# Patient Record
Sex: FEMALE | Race: WHITE | NOT HISPANIC OR LATINO | Employment: FULL TIME | URBAN - METROPOLITAN AREA
[De-identification: names, ages, dates, MRNs, and addresses within clinical notes are randomized per-mention and may not be internally consistent; named-entity substitution may affect disease eponyms.]

---

## 2017-02-23 ENCOUNTER — OFFICE VISIT (OUTPATIENT)
Dept: URGENT CARE | Facility: URGENT CARE | Age: 27
End: 2017-02-23
Payer: COMMERCIAL

## 2017-02-23 VITALS
OXYGEN SATURATION: 98 % | HEART RATE: 93 BPM | SYSTOLIC BLOOD PRESSURE: 122 MMHG | TEMPERATURE: 98.3 F | WEIGHT: 214 LBS | DIASTOLIC BLOOD PRESSURE: 72 MMHG

## 2017-02-23 DIAGNOSIS — J06.9 VIRAL URI: Primary | ICD-10-CM

## 2017-02-23 DIAGNOSIS — J02.9 ACUTE PHARYNGITIS, UNSPECIFIED: ICD-10-CM

## 2017-02-23 LAB
DEPRECATED S PYO AG THROAT QL EIA: NORMAL
MICRO REPORT STATUS: NORMAL
SPECIMEN SOURCE: NORMAL

## 2017-02-23 PROCEDURE — 87081 CULTURE SCREEN ONLY: CPT | Performed by: PHYSICIAN ASSISTANT

## 2017-02-23 PROCEDURE — 99203 OFFICE O/P NEW LOW 30 MIN: CPT | Performed by: PHYSICIAN ASSISTANT

## 2017-02-23 PROCEDURE — 87880 STREP A ASSAY W/OPTIC: CPT | Performed by: FAMILY MEDICINE

## 2017-02-23 NOTE — PROGRESS NOTES
"SUBJECTIVE:    Arpita Marcano presents to clinic today for evaluation of sore throat, nasal congestion, clear rhinorrhea, low grade fever(possible intermittent low-grade subjective) and a mild dry, non-productive cough x 3 days duration.     No known strep or mono contacts.     Patient denies any C/N/V/D, rash, abdominal pain or any other acute illness sxs.      No past medical history on file.    No current outpatient prescriptions on file.    Allergies   Allergen Reactions     Penicillins            OBJECTIVE:  /72 (BP Location: Right arm, Patient Position: Chair, Cuff Size: Adult Regular)  Pulse 93  Temp 98.3  F (36.8  C) (Tympanic)  Wt 214 lb (97.1 kg)  LMP  (LMP Unknown)  SpO2 98%  Breastfeeding? No    General appearance: alert and no apparent distress  Skin color is pink and without rash.  HEENT:   Conjunctiva not injected.  Sclera clear.  Left TM is normal: no effusions, no erythema, and normal landmarks.  Right TM is normal: no effusions, no erythema, and normal landmarks.  Nasal mucosa is congested.  Oropharyngeal exam is positive for mild, diffuse, erythema.  No plaque, exudate, lesions, or ulcers.   Neck is supple. FROM with no adenopathy  CARDIAC:NORMAL - regular rate and rhythm without murmur.  RESP: Normal - CTA without rales, rhonchi, or wheezing.    LABS:     Rapid Strep: Negative       ASSESSMENT/PLAN:    (J06.9,  B97.89) Viral URI  (primary encounter diagnosis)  Plan: Follow-up with PCP if sxs change, worsen or fail to resolve with home comfort care measures over the next 5-7 days.  In addition to the above, viral URI \"red flag\" signs and sxs are reviewed with pt both verbally and by way of printed educational material for home review.  Pt verbalizes understanding of and agrees to the above plan.         (J02.9) Acute pharyngitis, unspecified  Plan: Strep, Rapid Screen, Beta strep group A culture  As per above       "

## 2017-02-23 NOTE — PATIENT INSTRUCTIONS

## 2017-02-23 NOTE — NURSING NOTE
Chief Complaint   Patient presents with     Urgent Care     Pharyngitis     Pt states x 4 days has had sore throat, ear ache and plugging in both ears. Pt has taken dayquil for symptoms.        Initial /72 (BP Location: Right arm, Patient Position: Chair, Cuff Size: Adult Regular)  Pulse 93  Temp 98.3  F (36.8  C) (Tympanic)  Wt 214 lb (97.1 kg)  LMP  (LMP Unknown)  SpO2 98%  Breastfeeding? No There is no height or weight on file to calculate BMI.  Medication Reconciliation: unable or not appropriate to perform   Clark Reid CMA ([facility]) 2/23/2017 9:36 AM

## 2017-02-23 NOTE — MR AVS SNAPSHOT
After Visit Summary   2/23/2017    Arpita Marcano    MRN: [id number]           Patient Information     Date Of Birth          1990        Visit Information        Provider Department      2/23/2017 9:30 AM Eron Cordova PA-C [facility]        Today's Diagnoses     Acute pharyngitis, unspecified    -  1      Care Instructions      Viral Upper Respiratory Illness (Adult)  You have a viral upper respiratory illness (URI), which is another term for the common cold. This illness is contagious during the first few days. It is spread through the air by coughing and sneezing. It may also be spread by direct contact (touching the sick person and then touching your own eyes, nose, or mouth). Frequent handwashing will decrease risk of spread. Most viral illnesses go away within 7 to 10 days with rest and simple home remedies. Sometimes the illness may last for several weeks. Antibiotics will not kill a virus, and they are generally not prescribed for this condition.    Home care    If symptoms are severe, rest at home for the first 2 to 3 days. When you resume activity, don't let yourself get too tired.    Avoid being exposed to cigarette smoke (yours or others ).    You may use acetaminophen or ibuprofen to control pain and fever, unless another medicine was prescribed. (Note: If you have chronic liver or kidney disease, have ever had a stomach ulcer or gastrointestinal bleeding, or are taking blood-thinning medicines, talk with your healthcare provider before using these medicines.) Aspirin should never be given to anyone under 18 years of age who is ill with a viral infection or fever. It may cause severe liver or brain damage.    Your appetite may be poor, so a light diet is fine. Avoid dehydration by drinking 6 to 8 glasses of fluids per day (water, soft drinks, juices, tea, or soup). Extra fluids will help loosen secretions in the nose and lungs.    Over-the-counter  cold medicines will not shorten the length of time you re sick, but they may be helpful for the following symptoms: cough, sore throat, and nasal and sinus congestion. (Note: Do not use decongestants if you have high blood pressure.)  Follow-up care  Follow up with your healthcare provider, or as advised.  When to seek medical advice  Call your healthcare provider right away if any of these occur:    Cough with lots of colored sputum (mucus)    Severe headache; face, neck, or ear pain    Difficulty swallowing due to throat pain    Fever of 100.4 F (38 C)  Call 911, or get immediate medical care  Call emergency services right away if any of these occur:    Chest pain, shortness of breath, wheezing, or difficulty breathing    Coughing up blood    Inability to swallow due to throat pain    3752-5098 [employer]. [address] [place] [address]908. All rights reserved. This information is not intended as a substitute for professional medical care. Always follow your healthcare professional's instructions.              Follow-ups after your visit        Who to contact     If you have questions or need follow up information about today's clinic visit or your schedule please contact [facility] directly at [phone].  Normal or non-critical lab and imaging results will be communicated to you by [employer], letter or phone within 4 business days after the clinic has received the results. If you do not hear from us within 7 days, please contact the clinic through [employer] or phone. If you have a critical or abnormal lab result, we will notify you by phone as soon as possible.  Submit refill requests through [employer] or call your pharmacy and they will forward the refill request to us. Please allow 3 business days for your refill to be completed.          Additional Information About Your Visit        [employer] Information     [employer] lets you send messages to your doctor, view your test  "results, renew your prescriptions, schedule appointments and more. To sign up, go to [email or web address] . Click on \"Log in\" on the left side of the screen, which will take you to the Welcome page. Then click on \"Sign up Now\" on the right side of the page.     You will be asked to enter the access code listed below, as well as some personal information. Please follow the directions to create your username and password.     Your access code is: [email or web address]  Expires: 2017 10:09 AM     Your access code will  in 90 days. If you need help or a new code, please call your [facility] clinic or [phone].        Care EveryWhere ID     This is your Care EveryWhere ID. This could be used by other organizations to access your [facility] medical records  [id number]        Your Vitals Were     Pulse Temperature Last Period Pulse Oximetry Breastfeeding?       93 98.3  F (36.8  C) (Tympanic) (LMP Unknown) 98% No        Blood Pressure from Last 3 Encounters:   17 122/72    Weight from Last 3 Encounters:   17 214 lb (97.1 kg)              We Performed the Following     Beta strep group A culture     Strep, Rapid Screen        Primary Care Provider    None Specified       No primary provider on file.        Thank you!     Thank you for choosing [facility]  for your care. Our goal is always to provide you with excellent care. Hearing back from our patients is one way we can continue to improve our services. Please take a few minutes to complete the written survey that you may receive in the mail after your visit with us. Thank you!             Your Updated Medication List - Protect others around you: Learn how to safely use, store and throw away your medicines at www.disposemymeds.org.      Notice  As of 2017 10:09 AM    You have not been prescribed any medications.      "

## 2017-02-25 LAB
BACTERIA SPEC CULT: NORMAL
MICRO REPORT STATUS: NORMAL
SPECIMEN SOURCE: NORMAL

## 2017-05-28 ENCOUNTER — APPOINTMENT (OUTPATIENT)
Dept: ULTRASOUND IMAGING | Facility: CLINIC | Age: 27
End: 2017-05-28
Attending: EMERGENCY MEDICINE
Payer: COMMERCIAL

## 2017-05-28 ENCOUNTER — HOSPITAL ENCOUNTER (EMERGENCY)
Facility: CLINIC | Age: 27
Discharge: HOME | End: 2017-05-28
Attending: EMERGENCY MEDICINE | Admitting: EMERGENCY MEDICINE
Payer: COMMERCIAL

## 2017-05-28 VITALS
OXYGEN SATURATION: 99 % | RESPIRATION RATE: 18 BRPM | HEIGHT: 65 IN | TEMPERATURE: 99 F | WEIGHT: 215 LBS | DIASTOLIC BLOOD PRESSURE: 67 MMHG | SYSTOLIC BLOOD PRESSURE: 109 MMHG | BODY MASS INDEX: 35.82 KG/M2

## 2017-05-28 DIAGNOSIS — O03.4 INCOMPLETE MISCARRIAGE: ICD-10-CM

## 2017-05-28 LAB
ANION GAP SERPL CALCULATED.3IONS-SCNC: 4 MMOL/L (ref 3–14)
BASOPHILS # BLD AUTO: 0 10E9/L (ref 0–0.2)
BASOPHILS NFR BLD AUTO: 0.3 %
BUN SERPL-MCNC: 11 MG/DL (ref 7–30)
CALCIUM SERPL-MCNC: 8.9 MG/DL (ref 8.5–10.1)
CHLORIDE SERPL-SCNC: 107 MMOL/L (ref 94–109)
CO2 SERPL-SCNC: 26 MMOL/L (ref 20–32)
CREAT SERPL-MCNC: 0.78 MG/DL (ref 0.52–1.04)
DIFFERENTIAL METHOD BLD: NORMAL
EOSINOPHIL # BLD AUTO: 0.1 10E9/L (ref 0–0.7)
EOSINOPHIL NFR BLD AUTO: 1.2 %
ERYTHROCYTE [DISTWIDTH] IN BLOOD BY AUTOMATED COUNT: 11.8 % (ref 10–15)
GFR SERPL CREATININE-BSD FRML MDRD: 90 ML/MIN/1.7M2
GLUCOSE SERPL-MCNC: 105 MG/DL (ref 70–99)
HCT VFR BLD AUTO: 40.3 % (ref 35–47)
HGB BLD-MCNC: 13.5 G/DL (ref 11.7–15.7)
IMM GRANULOCYTES # BLD: 0 10E9/L (ref 0–0.4)
IMM GRANULOCYTES NFR BLD: 0.2 %
INTERPRETATION ECG - MUSE: NORMAL
LYMPHOCYTES # BLD AUTO: 1.6 10E9/L (ref 0.8–5.3)
LYMPHOCYTES NFR BLD AUTO: 26.5 %
MCH RBC QN AUTO: 29.5 PG (ref 26.5–33)
MCHC RBC AUTO-ENTMCNC: 33.5 G/DL (ref 31.5–36.5)
MCV RBC AUTO: 88 FL (ref 78–100)
MONOCYTES # BLD AUTO: 0.3 10E9/L (ref 0–1.3)
MONOCYTES NFR BLD AUTO: 5.4 %
NEUTROPHILS # BLD AUTO: 4 10E9/L (ref 1.6–8.3)
NEUTROPHILS NFR BLD AUTO: 66.4 %
NRBC # BLD AUTO: 0 10*3/UL
NRBC BLD AUTO-RTO: 0 /100
PLATELET # BLD AUTO: 193 10E9/L (ref 150–450)
POTASSIUM SERPL-SCNC: 4.1 MMOL/L (ref 3.4–5.3)
RBC # BLD AUTO: 4.58 10E12/L (ref 3.8–5.2)
SODIUM SERPL-SCNC: 137 MMOL/L (ref 133–144)
WBC # BLD AUTO: 6.1 10E9/L (ref 4–11)

## 2017-05-28 PROCEDURE — 80048 BASIC METABOLIC PNL TOTAL CA: CPT | Performed by: EMERGENCY MEDICINE

## 2017-05-28 PROCEDURE — 86850 RBC ANTIBODY SCREEN: CPT | Performed by: EMERGENCY MEDICINE

## 2017-05-28 PROCEDURE — 86901 BLOOD TYPING SEROLOGIC RH(D): CPT | Performed by: EMERGENCY MEDICINE

## 2017-05-28 PROCEDURE — 25000128 H RX IP 250 OP 636: Performed by: EMERGENCY MEDICINE

## 2017-05-28 PROCEDURE — 86900 BLOOD TYPING SEROLOGIC ABO: CPT | Performed by: EMERGENCY MEDICINE

## 2017-05-28 PROCEDURE — 76801 OB US < 14 WKS SINGLE FETUS: CPT

## 2017-05-28 PROCEDURE — 86923 COMPATIBILITY TEST ELECTRIC: CPT | Performed by: EMERGENCY MEDICINE

## 2017-05-28 PROCEDURE — 96361 HYDRATE IV INFUSION ADD-ON: CPT

## 2017-05-28 PROCEDURE — 96374 THER/PROPH/DIAG INJ IV PUSH: CPT

## 2017-05-28 PROCEDURE — 99285 EMERGENCY DEPT VISIT HI MDM: CPT | Mod: 25

## 2017-05-28 PROCEDURE — 85025 COMPLETE CBC W/AUTO DIFF WBC: CPT | Performed by: EMERGENCY MEDICINE

## 2017-05-28 PROCEDURE — 93005 ELECTROCARDIOGRAM TRACING: CPT

## 2017-05-28 RX ORDER — ONDANSETRON 2 MG/ML
4 INJECTION INTRAMUSCULAR; INTRAVENOUS
Status: DISCONTINUED | OUTPATIENT
Start: 2017-05-28 | End: 2017-05-28 | Stop reason: HOSPADM

## 2017-05-28 RX ORDER — HYDROCODONE BITARTRATE AND ACETAMINOPHEN 5; 325 MG/1; MG/1
1 TABLET ORAL EVERY 6 HOURS PRN
Qty: 15 TABLET | Refills: 0 | Status: SHIPPED | OUTPATIENT
Start: 2017-05-28 | End: 2017-11-21

## 2017-05-28 RX ORDER — ONDANSETRON 4 MG/1
4 TABLET, ORALLY DISINTEGRATING ORAL EVERY 8 HOURS PRN
Qty: 10 TABLET | Refills: 0 | Status: SHIPPED | OUTPATIENT
Start: 2017-05-28 | End: 2017-05-31

## 2017-05-28 RX ADMIN — SODIUM CHLORIDE 1000 ML: 9 INJECTION, SOLUTION INTRAVENOUS at 09:29

## 2017-05-28 RX ADMIN — ONDANSETRON 4 MG: 2 INJECTION INTRAMUSCULAR; INTRAVENOUS at 09:31

## 2017-05-28 ASSESSMENT — ENCOUNTER SYMPTOMS
VOMITING: 0
ABDOMINAL PAIN: 1
DIZZINESS: 1
BACK PAIN: 1
NAUSEA: 0
LIGHT-HEADEDNESS: 1

## 2017-05-28 NOTE — ED AVS SNAPSHOT
[facility] Emergency Department    [address] [place] [address]    Phone:  [phone]    Fax:  [phone]                                       Arpita Marcano   MRN: [id number]    Department:  [facility] Emergency Department   Date of Visit:  5/28/2017           Patient Information     Date Of Birth          1990        Your diagnoses for this visit were:     Incomplete miscarriage        You were seen by Chris Willson MD.      Follow-up Information     Follow up with Stacey Ch NP In 2 days.    Specialty:  Nurse Practitioner    Contact information:    [facility]  [address] [place] [address]  [phone]          Discharge Instructions         Discharge Instructions for Miscarriage  You have had a miscarriage. This is the unplanned end of a pregnancy before the baby is able to live outside the womb. You may have experienced a shock to your system, both physically and emotionally. Because of this, you may not feel well for a few days. Your body is going through changes, and you can expect mood swings. When you are ready, start back to your normal routine.  Home care    Return to work or your daily routines when you feel ready. This might be right away, or you may want to wait a few days.    Take showers instead of tub baths. This helps prevent infection. Ask your health care provider when you can take baths again.    Avoid strenuous exercise, such as aerobics or running, until the bleeding slows to the rate of a normal period.    Don t have sexual intercourse or use tampons or douches until your doctor says it s OK.    Get emotional support. Ask your doctor about support groups in your area. Many women find it helpful to talk to other women who have had a miscarriage.  Follow-up  Make a follow-up appointment with your health care provider.  When to call your health care provider  Call your health care provider right away if you  have any of the following:    Fever above 100.4 F (38 C) or chills    Bright red vaginal bleeding or a smelly discharge    Vaginal bleeding that soaks more than 1 menstrual pad per hour    Abdominal pain that is severe or getting worse     3154-3013 [employer]. [address] [place] [address]. All rights reserved. This information is not intended as a substitute for professional medical care. Always follow your healthcare professional's instructions.          24 Hour Appointment Hotline       To make an appointment at any [facility], call [phone] ([phone]). If you don't have a family doctor or clinic, we will help you find one. [facility] are conveniently located to serve the needs of you and your family.             Review of your medicines      START taking        Dose / Directions Last dose taken    HYDROcodone-acetaminophen 5-325 MG per tablet   Commonly known as:  NORCO   Dose:  1 tablet   Quantity:  15 tablet        Take 1 tablet by mouth every 6 hours as needed for moderate to severe pain   Refills:  0        ondansetron 4 MG ODT tab   Commonly known as:  ZOFRAN ODT   Dose:  4 mg   Quantity:  10 tablet        Take 1 tablet (4 mg) by mouth every 8 hours as needed for nausea   Refills:  0                Prescriptions were sent or printed at these locations (2 Prescriptions)                   Other Prescriptions                Printed at Department/Unit printer (2 of 2)         HYDROcodone-acetaminophen (NORCO) 5-325 MG per tablet               ondansetron (ZOFRAN ODT) 4 MG ODT tab                Procedures and tests performed during your visit     ABO/Rh type and screen    Basic metabolic panel (BMP)    CBC + differential    EKG 12-lead, tracing only    OB  US 1st trimester w transvag      Orders Needing Specimen Collection     None      Pending Results     No orders found from 5/26/2017 to 5/29/2017.            Pending Culture Results     No orders found from  5/26/2017 to 5/29/2017.            Pending Results Instructions     If you had any lab results that were not finalized at the time of your Discharge, you can call the ED Lab Result RN at [phone]. You will be contacted by this team for any positive Lab results or changes in treatment. The nurses are available 7 days a week from 10A to 6:30P.  You can leave a message 24 hours per day and they will return your call.        Test Results From Your Hospital Stay        5/28/2017 10:14 AM      Component Results     Component    ABO    O    RH(D)     Pos    Antibody Screen    Neg    Test Valid Only At    [facility]    Specimen Expires    05/31/2017 5/28/2017  9:31 AM      Component Results     Component Value Ref Range & Units Status    WBC 6.1 4.0 - 11.0 10e9/L Final    RBC Count 4.58 3.8 - 5.2 10e12/L Final    Hemoglobin 13.5 11.7 - 15.7 g/dL Final    Hematocrit 40.3 35.0 - 47.0 % Final    MCV 88 78 - 100 fl Final    MCH 29.5 26.5 - 33.0 pg Final    MCHC 33.5 31.5 - 36.5 g/dL Final    RDW 11.8 10.0 - 15.0 % Final    Platelet Count 193 150 - 450 10e9/L Final    Diff Method Automated Method  Final    % Neutrophils 66.4 % Final    % Lymphocytes 26.5 % Final    % Monocytes 5.4 % Final    % Eosinophils 1.2 % Final    % Basophils 0.3 % Final    % Immature Granulocytes 0.2 % Final    Nucleated RBCs 0 0 /100 Final    Absolute Neutrophil 4.0 1.6 - 8.3 10e9/L Final    Absolute Lymphocytes 1.6 0.8 - 5.3 10e9/L Final    Absolute Monocytes 0.3 0.0 - 1.3 10e9/L Final    Absolute Eosinophils 0.1 0.0 - 0.7 10e9/L Final    Absolute Basophils 0.0 0.0 - 0.2 10e9/L Final    Abs Immature Granulocytes 0.0 0 - 0.4 10e9/L Final    Absolute Nucleated RBC 0.0  Final         5/28/2017  9:48 AM      Component Results     Component Value Ref Range & Units Status    Sodium 137 133 - 144 mmol/L Final    Potassium 4.1 3.4 - 5.3 mmol/L Final    Chloride 107 94 - 109 mmol/L Final    Carbon Dioxide 26 20 - 32 mmol/L Final     Anion Gap 4 3 - 14 mmol/L Final    Glucose 105 (H) 70 - 99 mg/dL Final    Urea Nitrogen 11 7 - 30 mg/dL Final    Creatinine 0.78 0.52 - 1.04 mg/dL Final    GFR Estimate 90 >60 mL/min/1.7m2 Final    Non  GFR Calc    GFR Estimate If Black >90   GFR Calc   >60 mL/min/1.7m2 Final    Calcium 8.9 8.5 - 10.1 mg/dL Final         2017 10:23 AM      Narrative     EXAMINATION: US OB <14 WEEKS WITH TRANSVAGINAL SINGLE, 2017 10:13  AM     COMPARISON: None.    HISTORY: 6 week pregnancy with bleeding.    TECHNIQUE: The pelvis was scanned in standard fashion with  transabdominal and transvaginal transducer(s).    FINDINGS:   Irregularly-shaped intrauterine gestational sac is demonstrated with a  mean sac diameter of 28 mm, corresponding to 8 week, 0 day gestation.  No fetal pole is identified. However a small amount of echogenic  material is noted within the gestational sac.    Right ovary appears normal measuring 3.7 x 1.6 x 2.3 cm, left ovary  not visualized.            Impression     IMPRESSION:      1.  Irregularly-shaped intrauterine gestational sac with a small  amount of echogenic material in the gestational sac, but no fetal pole  identified. Findings may represent ongoing spontaneous . This  could be confirmed with serial beta hCG and/or repeat pelvic  ultrasound in 2 weeks.    [facility] [place]                Clinical Quality Measure: Blood Pressure Screening     Your blood pressure was checked while you were in the emergency department today. The last reading we obtained was  BP: 109/67 . Please read the guidelines below about what these numbers mean and what you should do about them.  If your systolic blood pressure (the top number) is less than 120 and your diastolic blood pressure (the bottom number) is less than 80, then your blood pressure is normal. There is nothing more that you need to do about it.  If your systolic blood pressure (the top number) is 120-139 or your  "diastolic blood pressure (the bottom number) is 80-89, your blood pressure may be higher than it should be. You should have your blood pressure rechecked within a year by a primary care provider.  If your systolic blood pressure (the top number) is 140 or greater or your diastolic blood pressure (the bottom number) is 90 or greater, you may have high blood pressure. High blood pressure is treatable, but if left untreated over time it can put you at risk for heart attack, stroke, or kidney failure. You should have your blood pressure rechecked by a primary care provider within the next 4 weeks.  If your provider in the emergency department today gave you specific instructions to follow-up with your doctor or provider even sooner than that, you should follow that instruction and not wait for up to 4 weeks for your follow-up visit.        Thank you for choosing [facility]       Thank you for choosing [facility] for your care. Our goal is always to provide you with excellent care. Hearing back from our patients is one way we can continue to improve our services. Please take a few minutes to complete the written survey that you may receive in the mail after you visit with us. Thank you!        [employer] Information     [employer] lets you send messages to your doctor, view your test results, renew your prescriptions, schedule appointments and more. To sign up, go to [email or web address] . Click on \"Log in\" on the left side of the screen, which will take you to the Welcome page. Then click on \"Sign up Now\" on the right side of the page.     You will be asked to enter the access code listed below, as well as some personal information. Please follow the directions to create your username and password.     Your access code is: [email or web address]  Expires: 2017 11:12 AM     Your access code will  in 90 days. If you need help or a new code, please call your [facility] clinic or [phone].        Care EveryWhere ID     This is " your Care EveryWhere ID. This could be used by other organizations to access your [place] medical records  [id number]        After Visit Summary       This is your record. Keep this with you and show to your community pharmacist(s) and doctor(s) at your next visit.

## 2017-05-28 NOTE — ED AVS SNAPSHOT
[facility] Emergency Department    [address]    [facility] [address]    Phone:  [phone]    Fax:  [phone]                                       Arpita Marcano   MRN: [id number]    Department:  [facility] Emergency Department   Date of Visit:  5/28/2017           After Visit Summary Signature Page     I have received my discharge instructions, and my questions have been answered. I have discussed any challenges I see with this plan with the nurse or doctor.    ..........................................................................................................................................  Patient/Patient Representative Signature      ..........................................................................................................................................  Patient Representative Print Name and Relationship to Patient    ..................................................               ................................................  Date                                            Time    ..........................................................................................................................................  Reviewed by Signature/Title    ...................................................              ..............................................  Date                                                            Time

## 2017-05-28 NOTE — ED PROVIDER NOTES
History     Chief Complaint:  Miscarriage    HPI:  Arpita Marcano is a 27 year old, otherwise healthy female who presents for evaluation of a possible miscarriage. The patient reports that she went to her first OB/GYN appointment on 5/16 for her first pregnancy. However, when she was at the appointment, they did not find a viable intrauterine pregnancy. She was informed that she would likely miscarry and start bleeding within the next week. On 5/24, she noticed light spotting. Last night, the bleeding increased and she notes feeling lightheaded and dizzy. This morning, she was bleeding significantly and noticed a large clot was passed around 0730, which she presumed to be the placenta. Shortly after that, she felt lightheaded and lost consciousness as she was sitting on the toilet. She is unsure if she hit her head at that time. Her boyfriend came in to help her stand up and she passed out again upon standing, but he was able to catch her before she fell. Given her sudden and worsening symptoms, they called EMS. Here, she complains of abdominal cramping and a sharp pain in her back, but denies nausea or vomiting.     Allergies:  Penicillin     Medications:    The patient is not currently taking any prescribed medications.      Past Medical History:    The patient does not have any past pertinent medical history.     Past Surgical History:    Bunionectomy  Tonsillectomy    Family History:    History reviewed. No pertinent family history.      Social History:  Smoking status: Current Everyday Smoker  Alcohol use: No   Marital Status:  Single    Presents with boyfriend at bedside.    Review of Systems   Gastrointestinal: Positive for abdominal pain. Negative for nausea and vomiting.   Genitourinary: Positive for vaginal bleeding.   Musculoskeletal: Positive for back pain.   Neurological: Positive for dizziness, syncope and light-headedness.   All other systems reviewed and are negative.      Physical Exam     Patient  "Vitals for the past 24 hrs:   BP Temp Temp src Heart Rate Resp SpO2 Height Weight   17 1100 - - - 70 - 99 % - -   17 1045 - - - 56 - 100 % - -   17 1033 - - - 77 - 100 % - -   17 1020 107/52 - - 59 - 100 % - -   17 1011 - - - 58 - 100 % - -   17 1010 114/73 - - - - 100 % - -   17 0935 109/64 - - 63 - 100 % - -   17 0931 112/73 - - 58 - 100 % - -   17 0930 - - - 61 - 100 % - -   17 0902 124/85 99  F (37.2  C) Oral 73 18 99 % 1.651 m (5' 5\") 97.5 kg (215 lb)      Physical Exam:  Constitutional: Alert, attentive  HENT:    Nose: Nose normal.    Mouth/Throat: Oropharynx is clear, mucous membranes are moist  Eyes:  EOM are normal. Pupils are equal, round, and reactive to light.   CV:  regular rate and rhythm; no murmurs, rubs or gallups  Chest: Effort normal and breath sounds normal.   GI:   Epigastrium - no tenderness, no guarding   RUQ - no tenderness or Olivarez's sign   RLQ - No tenderness, no guarding, no Rovsing's sign   Suprapubic area - no tenderness, no guarding    LLQ - no tenderness, no guarding   LUQ - no tenderness, no guarding   No distension. Normal bowel sounds  MSK: Normal range of motion.   Neurological: Alert, attentive  Skin: Skin is warm and dry.      Emergency Department Course     ECG (09:22:11):  Rate 59 bpm. SD interval 142. QRS duration 80. QT/QTc 400/396. P-R-T axes 62-30-39. Sinus bradycardia with sinus arrhythmia. Otherwise normal ECG. Interpreted at 0924 by Chris Willson MD.     Imaging:  Radiographic findings were communicated with the patient and significant other who voiced understanding of the findings.    OB US 1st trimester w/ Transvaginal:  IMPRESSION:      1.  Irregularly-shaped intrauterine gestational sac with a small  amount of echogenic material in the gestational sac, but no fetal pole  identified. Findings may represent ongoing spontaneous . This  could be confirmed with serial beta hCG and/or repeat " pelvic  ultrasound in 2 weeks.  As read by Radiology.    Laboratory:  CBC: WNL (WBC 6.1, HGB 13.5, )    Basic Metabolic Panel: Glucose 105 (H), o/w WNL (Creatinine 0.78)   ABO/Rh: O Positive    Interventions:   929- NS 1L IV Bolus   31- Zofran 4 mg IV    Emergency Department Course:  Past medical records, nursing notes, and vitals reviewed.  0911: I performed an exam of the patient and obtained history, as documented above.    IV inserted and blood drawn.     An ECG was obtained.     The above interventions were administered.    0932: I rechecked the patient. Explained findings to the patient.     The patient was sent for an OB ultrasound while in the emergency department, findings above.     1110: I rechecked the patient. Ultrasound, ECG, and laboratory findings and plan explained to the Patient and significant other. Patient discharged home with instructions regarding supportive care, medications, and reasons to return. The importance of close follow-up was reviewed.      Impression & Plan      Medical Decision Making:  Arpita Marcano is a 27 year old female  at approximately six weeks, who presents for evaluation of vaginal bleeding. She was diagnosed with blighted ovum earlier this week and has had bleeding, cramping, and syncopal episodes earlier today. There was no associated trauma. She is hemodynamically stable and an ultrasound reveals a likely incomplete miscarriage. On recheck, she is feeling significantly improved. There is no indication for Rhogam. She will be discharged with the plan for Norco and Zofran for symptomatic relief. She will follow up with her OB/GYN in two days. Strict return precautions were given for increased bleeding, worsening pain, fever, or for any other concerns. She was informed that she will likely continue bleeding until she passes the placenta completely.     Diagnosis:    ICD-10-CM   1. Incomplete miscarriage O03.4     Disposition:  Discharged to home with Norco  and Zofran.    Discharge Medications:  New Prescriptions    HYDROCODONE-ACETAMINOPHEN (NORCO) 5-325 MG PER TABLET    Take 1 tablet by mouth every 6 hours as needed for moderate to severe pain    ONDANSETRON (ZOFRAN ODT) 4 MG ODT TAB    Take 1 tablet (4 mg) by mouth every 8 hours as needed for nausea     Ami Gaspar  5/28/2017   [facility] EMERGENCY DEPARTMENT    I, Ami Gaspar, am serving as a scribe at 9:11 AM on 5/28/2017 to document services personally performed by Chris Willson MD based on my observations and the provider's statements to me.       Chris Willson MD  05/28/17 0116

## 2017-05-28 NOTE — ED NOTES
"Arrives via EMS after miscarrying at home.  Was told last night that would miscarry this week.  Was feeling dizzy last night; this morning awoke around 0730 and passed \"a big clunk\", \"blood was pouring out\".  Passed out into the bathtub, later passed out again after standing. Feels as though is still bleeding.  Patient pale in color; VSS; ABCD's intact; A/O x 4.   "

## 2017-05-28 NOTE — DISCHARGE INSTRUCTIONS
Discharge Instructions for Miscarriage  You have had a miscarriage. This is the unplanned end of a pregnancy before the baby is able to live outside the womb. You may have experienced a shock to your system, both physically and emotionally. Because of this, you may not feel well for a few days. Your body is going through changes, and you can expect mood swings. When you are ready, start back to your normal routine.  Home care    Return to work or your daily routines when you feel ready. This might be right away, or you may want to wait a few days.    Take showers instead of tub baths. This helps prevent infection. Ask your health care provider when you can take baths again.    Avoid strenuous exercise, such as aerobics or running, until the bleeding slows to the rate of a normal period.    Don t have sexual intercourse or use tampons or douches until your doctor says it s OK.    Get emotional support. Ask your doctor about support groups in your area. Many women find it helpful to talk to other women who have had a miscarriage.  Follow-up  Make a follow-up appointment with your health care provider.  When to call your health care provider  Call your health care provider right away if you have any of the following:    Fever above 100.4 F (38 C) or chills    Bright red vaginal bleeding or a smelly discharge    Vaginal bleeding that soaks more than 1 menstrual pad per hour    Abdominal pain that is severe or getting worse     8563-7158 [employer]. [address] [place] [address]. All rights reserved. This information is not intended as a substitute for professional medical care. Always follow your healthcare professional's instructions.

## 2017-05-30 ENCOUNTER — ANESTHESIA (OUTPATIENT)
Dept: SURGERY | Facility: CLINIC | Age: 27
End: 2017-05-30
Payer: COMMERCIAL

## 2017-05-30 ENCOUNTER — HOSPITAL ENCOUNTER (OUTPATIENT)
Facility: CLINIC | Age: 27
Discharge: HOME | End: 2017-05-31
Attending: EMERGENCY MEDICINE | Admitting: OBSTETRICS & GYNECOLOGY
Payer: COMMERCIAL

## 2017-05-30 ENCOUNTER — APPOINTMENT (OUTPATIENT)
Dept: ULTRASOUND IMAGING | Facility: CLINIC | Age: 27
End: 2017-05-30
Attending: EMERGENCY MEDICINE
Payer: COMMERCIAL

## 2017-05-30 ENCOUNTER — ANESTHESIA EVENT (OUTPATIENT)
Dept: SURGERY | Facility: CLINIC | Age: 27
End: 2017-05-30
Payer: COMMERCIAL

## 2017-05-30 DIAGNOSIS — D62 ANEMIA DUE TO BLOOD LOSS, ACUTE: ICD-10-CM

## 2017-05-30 DIAGNOSIS — O03.4 INCOMPLETE MISCARRIAGE: ICD-10-CM

## 2017-05-30 LAB
ABO + RH BLD: NORMAL
B-HCG SERPL-ACNC: 7876 IU/L (ref 0–5)
BLD GP AB SCN SERPL QL: NORMAL
BLD GP AB SCN SERPL QL: NORMAL
BLD PROD TYP BPU: NORMAL
BLD PROD TYP BPU: NORMAL
BLD UNIT ID BPU: 0
BLOOD BANK CMNT PATIENT-IMP: NORMAL
BLOOD BANK CMNT PATIENT-IMP: NORMAL
BLOOD PRODUCT CODE: NORMAL
BPU ID: NORMAL
ERYTHROCYTE [DISTWIDTH] IN BLOOD BY AUTOMATED COUNT: 11.9 % (ref 10–15)
HCT VFR BLD AUTO: 37.4 % (ref 35–47)
HGB BLD-MCNC: 10.3 G/DL (ref 11.7–15.7)
HGB BLD-MCNC: 12.5 G/DL (ref 11.7–15.7)
HGB BLD-MCNC: NORMAL G/DL (ref 11.7–15.7)
MCH RBC QN AUTO: 29.1 PG (ref 26.5–33)
MCHC RBC AUTO-ENTMCNC: 33.4 G/DL (ref 31.5–36.5)
MCV RBC AUTO: 87 FL (ref 78–100)
NUM BPU REQUESTED: 1
PLATELET # BLD AUTO: 238 10E9/L (ref 150–450)
RBC # BLD AUTO: 4.3 10E12/L (ref 3.8–5.2)
SPECIMEN EXP DATE BLD: NORMAL
SPECIMEN EXP DATE BLD: NORMAL
TRANSFUSION STATUS PATIENT QL: NORMAL
TRANSFUSION STATUS PATIENT QL: NORMAL
WBC # BLD AUTO: 9.3 10E9/L (ref 4–11)

## 2017-05-30 PROCEDURE — 40000306 ZZH STATISTIC PRE PROC ASSESS II: Performed by: OBSTETRICS & GYNECOLOGY

## 2017-05-30 PROCEDURE — 86901 BLOOD TYPING SEROLOGIC RH(D): CPT | Performed by: EMERGENCY MEDICINE

## 2017-05-30 PROCEDURE — 36000050 ZZH SURGERY LEVEL 2 1ST 30 MIN: Performed by: OBSTETRICS & GYNECOLOGY

## 2017-05-30 PROCEDURE — 99285 EMERGENCY DEPT VISIT HI MDM: CPT | Mod: 25

## 2017-05-30 PROCEDURE — 84702 CHORIONIC GONADOTROPIN TEST: CPT | Performed by: EMERGENCY MEDICINE

## 2017-05-30 PROCEDURE — 71000012 ZZH RECOVERY PHASE 1 LEVEL 1 FIRST HR: Performed by: OBSTETRICS & GYNECOLOGY

## 2017-05-30 PROCEDURE — 86850 RBC ANTIBODY SCREEN: CPT | Performed by: EMERGENCY MEDICINE

## 2017-05-30 PROCEDURE — 85027 COMPLETE CBC AUTOMATED: CPT | Performed by: EMERGENCY MEDICINE

## 2017-05-30 PROCEDURE — 96374 THER/PROPH/DIAG INJ IV PUSH: CPT

## 2017-05-30 PROCEDURE — 25000132 ZZH RX MED GY IP 250 OP 250 PS 637: Performed by: OBSTETRICS & GYNECOLOGY

## 2017-05-30 PROCEDURE — 27210794 ZZH OR GENERAL SUPPLY STERILE: Performed by: OBSTETRICS & GYNECOLOGY

## 2017-05-30 PROCEDURE — 25000566 ZZH SEVOFLURANE, EA 15 MIN: Performed by: OBSTETRICS & GYNECOLOGY

## 2017-05-30 PROCEDURE — 76801 OB US < 14 WKS SINGLE FETUS: CPT

## 2017-05-30 PROCEDURE — 37000008 ZZH ANESTHESIA TECHNICAL FEE, 1ST 30 MIN: Performed by: OBSTETRICS & GYNECOLOGY

## 2017-05-30 PROCEDURE — 71000013 ZZH RECOVERY PHASE 1 LEVEL 1 EA ADDTL HR: Performed by: OBSTETRICS & GYNECOLOGY

## 2017-05-30 PROCEDURE — 37000009 ZZH ANESTHESIA TECHNICAL FEE, EACH ADDTL 15 MIN: Performed by: OBSTETRICS & GYNECOLOGY

## 2017-05-30 PROCEDURE — 96376 TX/PRO/DX INJ SAME DRUG ADON: CPT

## 2017-05-30 PROCEDURE — 25000128 H RX IP 250 OP 636: Performed by: EMERGENCY MEDICINE

## 2017-05-30 PROCEDURE — 85018 HEMOGLOBIN: CPT | Performed by: EMERGENCY MEDICINE

## 2017-05-30 PROCEDURE — P9016 RBC LEUKOCYTES REDUCED: HCPCS | Performed by: EMERGENCY MEDICINE

## 2017-05-30 PROCEDURE — 86900 BLOOD TYPING SEROLOGIC ABO: CPT | Performed by: EMERGENCY MEDICINE

## 2017-05-30 RX ORDER — HYDROMORPHONE HYDROCHLORIDE 1 MG/ML
0.5 INJECTION, SOLUTION INTRAMUSCULAR; INTRAVENOUS; SUBCUTANEOUS ONCE
Status: COMPLETED | OUTPATIENT
Start: 2017-05-30 | End: 2017-05-30

## 2017-05-30 RX ORDER — DOXYCYCLINE 100 MG/10ML
100 INJECTION, POWDER, LYOPHILIZED, FOR SOLUTION INTRAVENOUS
Status: COMPLETED | OUTPATIENT
Start: 2017-05-30 | End: 2017-05-31

## 2017-05-30 RX ORDER — SODIUM CHLORIDE 9 MG/ML
INJECTION, SOLUTION INTRAVENOUS CONTINUOUS
Status: DISCONTINUED | OUTPATIENT
Start: 2017-05-31 | End: 2017-05-31 | Stop reason: HOSPADM

## 2017-05-30 RX ORDER — ACETAMINOPHEN 325 MG/1
975 TABLET ORAL ONCE
Status: COMPLETED | OUTPATIENT
Start: 2017-05-30 | End: 2017-05-30

## 2017-05-30 RX ADMIN — ACETAMINOPHEN 975 MG: 325 TABLET, FILM COATED ORAL at 23:43

## 2017-05-30 RX ADMIN — Medication 0.5 MG: at 21:52

## 2017-05-30 RX ADMIN — SODIUM CHLORIDE 500 ML: 9 INJECTION, SOLUTION INTRAVENOUS at 23:28

## 2017-05-30 RX ADMIN — Medication 0.5 MG: at 20:26

## 2017-05-30 NOTE — IP AVS SNAPSHOT
[place] [address] [facility]    [address]    [facility] [address]    Phone:  [phone]    Fax:  [phone]                                       After Visit Summary   5/30/2017    Arpita Marcano    MRN: [id number]           After Visit Summary Signature Page     I have received my discharge instructions, and my questions have been answered. I have discussed any challenges I see with this plan with the nurse or doctor.    ..........................................................................................................................................  Patient/Patient Representative Signature      ..........................................................................................................................................  Patient Representative Print Name and Relationship to Patient    ..................................................               ................................................  Date                                            Time    ..........................................................................................................................................  Reviewed by Signature/Title    ...................................................              ..............................................  Date                                                            Time

## 2017-05-30 NOTE — IP AVS SNAPSHOT
MRN:[id number]                      After Visit Summary   5/30/2017    Arpita Marcano    MRN: [id number]           Thank you!     Thank you for choosing [facility] for your care. Our goal is always to provide you with excellent care. Hearing back from our patients is one way we can continue to improve our services. Please take a few minutes to complete the written survey that you may receive in the mail after you visit. If you would like to speak to someone directly about your visit please contact Patient Relations at [phone]. Thank you!          Patient Information     Date Of Birth          1990        Designated Caregiver       Most Recent Value    Caregiver    Will someone help with your care after discharge? yes    Name of designated caregiver Derik    Phone number of caregiver [phone]    Caregiver address [place]      About your hospital stay     You were admitted on:  May 31, 2017 You last received care in the:  [place] [address] [facility]    You were discharged on:  May 31, 2017       Who to Call     For medical emergencies, please call 911.  For non-urgent questions about your medical care, please call your primary care provider or clinic, None  For questions related to your surgery, please call your surgery clinic        Attending Provider     Provider Specialty    Maximiliano Fam MD Emergency Medicine    Cornell Nogueira MD OB/Gyn       Primary Care Provider    Physician No Ref-Primary      After Care Instructions     Diet Instructions       Resume pre-procedure diet            No Alcohol       For 24 hours post procedure            No driving or operating machinery        until the day after procedure                  Pending Results     Date and Time Order Name Status Description    5/31/2017 0020 Surgical pathology exam In process     5/30/2017 2010 POC US ABDOMEN LIMITED In process             Admission Information     Date & Time  "Provider Department Dept. Phone    2017 Cornell Nogueira MD [place] [address] [facility] [phone]      Your Vitals Were     Blood Pressure Pulse Temperature Respirations Last Period Pulse Oximetry    116/57 (BP Location: Right arm) 115 98  F (36.7  C) (Oral) 18 2017 97%      MyChart Information     MyChart lets you send messages to your doctor, view your test results, renew your prescriptions, schedule appointments and more. To sign up, go to [email or web address] . Click on \"Log in\" on the left side of the screen, which will take you to the Welcome page. Then click on \"Sign up Now\" on the right side of the page.     You will be asked to enter the access code listed below, as well as some personal information. Please follow the directions to create your username and password.     Your access code is: [email or web address]  Expires: 2017 11:12 AM     Your access code will  in 90 days. If you need help or a new code, please call your [facility] clinic or [phone].        Care EveryWhere ID     This is your Care EveryWhere ID. This could be used by other organizations to access your [facility] [place] medical records  [id number]           Review of your medicines      CONTINUE these medicines which have NOT CHANGED        Dose / Directions    HYDROcodone-acetaminophen 5-325 MG per tablet   Commonly known as:  NORCO        Dose:  1 tablet   Take 1 tablet by mouth every 6 hours as needed for moderate to severe pain   Quantity:  15 tablet   Refills:  0       ondansetron 4 MG ODT tab   Commonly known as:  ZOFRAN ODT        Dose:  4 mg   Take 1 tablet (4 mg) by mouth every 8 hours as needed for nausea   Quantity:  10 tablet   Refills:  0                Protect others around you: Learn how to safely use, store and throw away your medicines at www.disposemymeds.org.             Medication List: This is a list of all your medications and when to take them. Check marks below indicate your daily home " schedule. Keep this list as a reference.      Medications           Morning Afternoon Evening Bedtime As Needed    HYDROcodone-acetaminophen 5-325 MG per tablet   Commonly known as:  NORCO   Take 1 tablet by mouth every 6 hours as needed for moderate to severe pain                                ondansetron 4 MG ODT tab   Commonly known as:  ZOFRAN ODT   Take 1 tablet (4 mg) by mouth every 8 hours as needed for nausea

## 2017-05-31 VITALS
TEMPERATURE: 98 F | SYSTOLIC BLOOD PRESSURE: 116 MMHG | RESPIRATION RATE: 18 BRPM | HEART RATE: 115 BPM | DIASTOLIC BLOOD PRESSURE: 57 MMHG | OXYGEN SATURATION: 97 %

## 2017-05-31 PROBLEM — Z98.890 S/P DILATATION AND CURETTAGE: Status: ACTIVE | Noted: 2017-05-31

## 2017-05-31 LAB — HGB BLD-MCNC: 10.2 G/DL (ref 11.7–15.7)

## 2017-05-31 PROCEDURE — 25000125 ZZHC RX 250: Performed by: ANESTHESIOLOGY

## 2017-05-31 PROCEDURE — 25000125 ZZHC RX 250: Performed by: NURSE ANESTHETIST, CERTIFIED REGISTERED

## 2017-05-31 PROCEDURE — 00000159 ZZHCL STATISTIC H-SEND OUTS PREP: Performed by: OBSTETRICS & GYNECOLOGY

## 2017-05-31 PROCEDURE — 25000125 ZZHC RX 250: Performed by: OBSTETRICS & GYNECOLOGY

## 2017-05-31 PROCEDURE — 36415 COLL VENOUS BLD VENIPUNCTURE: CPT | Performed by: OBSTETRICS & GYNECOLOGY

## 2017-05-31 PROCEDURE — 25000128 H RX IP 250 OP 636: Performed by: OBSTETRICS & GYNECOLOGY

## 2017-05-31 PROCEDURE — 25000128 H RX IP 250 OP 636: Performed by: NURSE ANESTHETIST, CERTIFIED REGISTERED

## 2017-05-31 PROCEDURE — 88305 TISSUE EXAM BY PATHOLOGIST: CPT | Mod: 26 | Performed by: OBSTETRICS & GYNECOLOGY

## 2017-05-31 PROCEDURE — 88305 TISSUE EXAM BY PATHOLOGIST: CPT | Performed by: OBSTETRICS & GYNECOLOGY

## 2017-05-31 PROCEDURE — 85018 HEMOGLOBIN: CPT | Performed by: OBSTETRICS & GYNECOLOGY

## 2017-05-31 RX ORDER — ONDANSETRON 2 MG/ML
4 INJECTION INTRAMUSCULAR; INTRAVENOUS EVERY 30 MIN PRN
Status: DISCONTINUED | OUTPATIENT
Start: 2017-05-31 | End: 2017-05-31 | Stop reason: HOSPADM

## 2017-05-31 RX ORDER — PROPOFOL 10 MG/ML
INJECTION, EMULSION INTRAVENOUS PRN
Status: DISCONTINUED | OUTPATIENT
Start: 2017-05-31 | End: 2017-05-31

## 2017-05-31 RX ORDER — GLYCOPYRROLATE 0.2 MG/ML
INJECTION, SOLUTION INTRAMUSCULAR; INTRAVENOUS PRN
Status: DISCONTINUED | OUTPATIENT
Start: 2017-05-31 | End: 2017-05-31

## 2017-05-31 RX ORDER — MEPERIDINE HYDROCHLORIDE 25 MG/ML
12.5 INJECTION INTRAMUSCULAR; INTRAVENOUS; SUBCUTANEOUS EVERY 5 MIN PRN
Status: DISCONTINUED | OUTPATIENT
Start: 2017-05-31 | End: 2017-05-31 | Stop reason: HOSPADM

## 2017-05-31 RX ORDER — LIDOCAINE HYDROCHLORIDE 10 MG/ML
INJECTION, SOLUTION INFILTRATION; PERINEURAL PRN
Status: DISCONTINUED | OUTPATIENT
Start: 2017-05-31 | End: 2017-05-31

## 2017-05-31 RX ORDER — SODIUM CHLORIDE 9 MG/ML
INJECTION, SOLUTION INTRAVENOUS CONTINUOUS PRN
Status: DISCONTINUED | OUTPATIENT
Start: 2017-05-31 | End: 2017-05-31

## 2017-05-31 RX ORDER — DIAZEPAM 10 MG/2ML
2.5 INJECTION, SOLUTION INTRAMUSCULAR; INTRAVENOUS
Status: DISCONTINUED | OUTPATIENT
Start: 2017-05-31 | End: 2017-05-31 | Stop reason: HOSPADM

## 2017-05-31 RX ORDER — ACETAMINOPHEN 325 MG/1
650 TABLET ORAL EVERY 6 HOURS PRN
Status: DISCONTINUED | OUTPATIENT
Start: 2017-05-31 | End: 2017-05-31 | Stop reason: HOSPADM

## 2017-05-31 RX ORDER — SODIUM CHLORIDE, SODIUM LACTATE, POTASSIUM CHLORIDE, CALCIUM CHLORIDE 600; 310; 30; 20 MG/100ML; MG/100ML; MG/100ML; MG/100ML
INJECTION, SOLUTION INTRAVENOUS CONTINUOUS
Status: DISCONTINUED | OUTPATIENT
Start: 2017-05-31 | End: 2017-05-31 | Stop reason: HOSPADM

## 2017-05-31 RX ORDER — KETOROLAC TROMETHAMINE 30 MG/ML
30 INJECTION, SOLUTION INTRAMUSCULAR; INTRAVENOUS EVERY 6 HOURS PRN
Status: DISCONTINUED | OUTPATIENT
Start: 2017-05-31 | End: 2017-05-31 | Stop reason: HOSPADM

## 2017-05-31 RX ORDER — LABETALOL HYDROCHLORIDE 5 MG/ML
10 INJECTION, SOLUTION INTRAVENOUS
Status: DISCONTINUED | OUTPATIENT
Start: 2017-05-31 | End: 2017-05-31 | Stop reason: HOSPADM

## 2017-05-31 RX ORDER — EPHEDRINE SULFATE 50 MG/ML
INJECTION, SOLUTION INTRAVENOUS PRN
Status: DISCONTINUED | OUTPATIENT
Start: 2017-05-31 | End: 2017-05-31

## 2017-05-31 RX ORDER — DROPERIDOL 2.5 MG/ML
0.62 INJECTION, SOLUTION INTRAMUSCULAR; INTRAVENOUS
Status: DISCONTINUED | OUTPATIENT
Start: 2017-05-31 | End: 2017-05-31 | Stop reason: HOSPADM

## 2017-05-31 RX ORDER — ONDANSETRON 4 MG/1
4 TABLET, ORALLY DISINTEGRATING ORAL EVERY 6 HOURS PRN
Status: DISCONTINUED | OUTPATIENT
Start: 2017-05-31 | End: 2017-05-31 | Stop reason: HOSPADM

## 2017-05-31 RX ORDER — ONDANSETRON 4 MG/1
4 TABLET, ORALLY DISINTEGRATING ORAL EVERY 30 MIN PRN
Status: DISCONTINUED | OUTPATIENT
Start: 2017-05-31 | End: 2017-05-31 | Stop reason: HOSPADM

## 2017-05-31 RX ORDER — SODIUM CHLORIDE, SODIUM LACTATE, POTASSIUM CHLORIDE, CALCIUM CHLORIDE 600; 310; 30; 20 MG/100ML; MG/100ML; MG/100ML; MG/100ML
INJECTION, SOLUTION INTRAVENOUS CONTINUOUS PRN
Status: DISCONTINUED | OUTPATIENT
Start: 2017-05-31 | End: 2017-05-31

## 2017-05-31 RX ORDER — NALOXONE HYDROCHLORIDE 0.4 MG/ML
.1-.4 INJECTION, SOLUTION INTRAMUSCULAR; INTRAVENOUS; SUBCUTANEOUS
Status: DISCONTINUED | OUTPATIENT
Start: 2017-05-31 | End: 2017-05-31 | Stop reason: HOSPADM

## 2017-05-31 RX ORDER — HYDROMORPHONE HYDROCHLORIDE 1 MG/ML
.3-.5 INJECTION, SOLUTION INTRAMUSCULAR; INTRAVENOUS; SUBCUTANEOUS EVERY 5 MIN PRN
Status: DISCONTINUED | OUTPATIENT
Start: 2017-05-31 | End: 2017-05-31 | Stop reason: HOSPADM

## 2017-05-31 RX ORDER — ALBUTEROL SULFATE 0.83 MG/ML
2.5 SOLUTION RESPIRATORY (INHALATION) EVERY 4 HOURS PRN
Status: DISCONTINUED | OUTPATIENT
Start: 2017-05-31 | End: 2017-05-31 | Stop reason: HOSPADM

## 2017-05-31 RX ORDER — HYDROMORPHONE HCL/0.9% NACL/PF 0.2MG/0.2
0.2 SYRINGE (ML) INTRAVENOUS
Status: DISCONTINUED | OUTPATIENT
Start: 2017-05-31 | End: 2017-05-31 | Stop reason: HOSPADM

## 2017-05-31 RX ORDER — DIMENHYDRINATE 50 MG/ML
25 INJECTION, SOLUTION INTRAMUSCULAR; INTRAVENOUS
Status: DISCONTINUED | OUTPATIENT
Start: 2017-05-31 | End: 2017-05-31 | Stop reason: HOSPADM

## 2017-05-31 RX ORDER — METOCLOPRAMIDE 10 MG/1
10 TABLET ORAL EVERY 6 HOURS PRN
Status: DISCONTINUED | OUTPATIENT
Start: 2017-05-31 | End: 2017-05-31 | Stop reason: HOSPADM

## 2017-05-31 RX ORDER — FENTANYL CITRATE 50 UG/ML
INJECTION, SOLUTION INTRAMUSCULAR; INTRAVENOUS PRN
Status: DISCONTINUED | OUTPATIENT
Start: 2017-05-31 | End: 2017-05-31

## 2017-05-31 RX ORDER — FENTANYL CITRATE 50 UG/ML
25-50 INJECTION, SOLUTION INTRAMUSCULAR; INTRAVENOUS
Status: DISCONTINUED | OUTPATIENT
Start: 2017-05-31 | End: 2017-05-31 | Stop reason: HOSPADM

## 2017-05-31 RX ORDER — PROCHLORPERAZINE MALEATE 5 MG
5-10 TABLET ORAL EVERY 6 HOURS PRN
Status: DISCONTINUED | OUTPATIENT
Start: 2017-05-31 | End: 2017-05-31 | Stop reason: HOSPADM

## 2017-05-31 RX ORDER — ONDANSETRON 2 MG/ML
4 INJECTION INTRAMUSCULAR; INTRAVENOUS EVERY 6 HOURS PRN
Status: DISCONTINUED | OUTPATIENT
Start: 2017-05-31 | End: 2017-05-31 | Stop reason: HOSPADM

## 2017-05-31 RX ORDER — OXYCODONE HYDROCHLORIDE 5 MG/1
5-10 TABLET ORAL
Status: DISCONTINUED | OUTPATIENT
Start: 2017-05-31 | End: 2017-05-31 | Stop reason: HOSPADM

## 2017-05-31 RX ORDER — METOCLOPRAMIDE HYDROCHLORIDE 5 MG/ML
10 INJECTION INTRAMUSCULAR; INTRAVENOUS EVERY 6 HOURS PRN
Status: DISCONTINUED | OUTPATIENT
Start: 2017-05-31 | End: 2017-05-31 | Stop reason: HOSPADM

## 2017-05-31 RX ORDER — METHYLERGONOVINE MALEATE 0.2 MG/ML
INJECTION INTRAVENOUS PRN
Status: DISCONTINUED | OUTPATIENT
Start: 2017-05-31 | End: 2017-05-31

## 2017-05-31 RX ADMIN — MIDAZOLAM HYDROCHLORIDE 2 MG: 1 INJECTION, SOLUTION INTRAMUSCULAR; INTRAVENOUS at 00:00

## 2017-05-31 RX ADMIN — FENTANYL CITRATE 100 MCG: 50 INJECTION, SOLUTION INTRAMUSCULAR; INTRAVENOUS at 00:09

## 2017-05-31 RX ADMIN — METHYLERGONOVINE MALEATE 200 MCG: 0.2 INJECTION INTRAVENOUS at 00:16

## 2017-05-31 RX ADMIN — PROPOFOL 200 MG: 10 INJECTION, EMULSION INTRAVENOUS at 00:09

## 2017-05-31 RX ADMIN — DOXYCYCLINE HYCLATE 100 MG: 100 INJECTION, POWDER, LYOPHILIZED, FOR SOLUTION INTRAVENOUS at 00:00

## 2017-05-31 RX ADMIN — SODIUM CHLORIDE: 9 INJECTION, SOLUTION INTRAVENOUS at 00:00

## 2017-05-31 RX ADMIN — PHENYLEPHRINE HYDROCHLORIDE 150 MCG: 10 INJECTION, SOLUTION INTRAMUSCULAR; INTRAVENOUS; SUBCUTANEOUS at 00:12

## 2017-05-31 RX ADMIN — OXYTOCIN: 10 INJECTION, SOLUTION INTRAMUSCULAR; INTRAVENOUS at 00:21

## 2017-05-31 RX ADMIN — EPHEDRINE SULFATE 10 MG: 50 INJECTION, SOLUTION INTRAVENOUS at 00:10

## 2017-05-31 RX ADMIN — LIDOCAINE HYDROCHLORIDE 30 MG: 10 INJECTION, SOLUTION INFILTRATION; PERINEURAL at 00:09

## 2017-05-31 RX ADMIN — GLYCOPYRROLATE 0.2 MG: 0.2 INJECTION, SOLUTION INTRAMUSCULAR; INTRAVENOUS at 00:09

## 2017-05-31 RX ADMIN — KETOROLAC TROMETHAMINE 30 MG: 30 INJECTION, SOLUTION INTRAMUSCULAR at 00:45

## 2017-05-31 NOTE — PROGRESS NOTES
OB PN    No further bleeding post D&C    VSS, AF    Hgb 10.2 this AM.      Abdomen soft, NT  Ext NT    A/P:  Stable post D&C           Discharge to home

## 2017-05-31 NOTE — ED PROVIDER NOTES
History     Chief Complaint:  Vaginal Bleeding    HPI   Limited history obtain from the boy friend due to the patient's current condition  Arpita Marcano is a 27 year old female who presents with her boy friend vaginal bleeding. The patient was seen in the ED on 5/28/2017 for vaginal bleeding and was diagnosed with an incomplete miscarriage. She was sent discharged home with Norco and Zofran and follow up with OBGYN. The boy friend states that yesterday the patient felt tired and dizzy. About 30 minutes ago today, the patient experienced a large amount of continuous vaginal discharge which prompted the boy friend bring her to the ED. While waiting in the ED waiting room, the boy friend notes that the patient passed out. Currently, the patient is conscious with continued vaginal bleeding. She denies any injury and reports feeling lightheaded/dizzy. She has gone through several feminine pads today.    Allergies:  Penicillins     Medications:    Norco  Zofran      Past Medical History:    Miscarriage     Past Surgical History:    Bunionectomy  Tonsillectomy    Family History:    History reviewed. No pertinent family history.      Social History:  Smoking status: Every day smoker  Alcohol use: No     Review of Systems   Genitourinary: Positive for vaginal bleeding and vaginal discharge.   Neurological: Positive for syncope.   ROS limited due to patients condition     Physical Exam     Patient Vitals for the past 24 hrs:   BP Temp Temp src Pulse Heart Rate Resp SpO2   05/30/17 2147 - - - - - - 100 %   05/30/17 2130 100/46 - - - - - 100 %   05/30/17 2115 100/55 - - - - - 100 %   05/30/17 2100 100/52 - - - - - 100 %   05/30/17 2045 116/67 - - - - - -   05/30/17 2035 111/63 - - - - - 100 %   05/30/17 2030 109/67 - - - - - -   05/30/17 2015 - - - - - - 100 %   05/30/17 2014 - - - - - - 100 %   05/30/17 2013 - - - - - - 99 %   05/30/17 2012 98/76 - - - - - -   05/30/17 2010 - 98.2  F (36.8  C) - - - - -   05/30/17 2008 120/69  - - - - - 100 %   17 140/76 - - 115 115 22 100 %   17 140/76 - - - - - -        Physical Exam   Constitutional:   Pale appearing   HENT:   Right Ear: External ear normal.   Left Ear: External ear normal.   Nose: Nose normal.   Eyes: Conjunctivae and lids are normal.   Neck: Neck supple. No tracheal deviation present.   Cardiovascular: Regular rhythm and intact distal pulses.    tachycardic   Pulmonary/Chest: Breath sounds normal. No respiratory distress.   Abdominal: Soft. She exhibits no distension. There is tenderness (+ suprapubic ttp ). There is no rebound and no guarding.   Genitourinary:   Genitourinary Comments: Exam done with female chaperone in room.  Large amount of clot and bleeding from vaginal vault, no obvious appearance of products of conception   Musculoskeletal:   No peripheral edema   Neurological:   MAEE, no gross focal motor or sensory deficit   Skin: Skin is warm and dry. She is not diaphoretic. There is pallor.   Psychiatric: She has a normal mood and affect.   Nursing note and vitals reviewed.        Emergency Department Course   Imaging:  Radiographic findings were communicated with the patient who voiced understanding of the findings.    US OB < 14 Weeks single  Appearance of blighted ovum noted previously has  progressed to incomplete .  As read by Radiology.     Laboratory:  HCG Quantitative: 7876 (H)  ABO/Rh type and screen: O positive   CBC: WNL (WBC 9.3, HGB 12.5, )      2257 - Hemoglobin: 10.3 (L)    Procedures:  POC US ABDOMEN LIMITED (Final result) Result time: 17 11:49:22     Final result by Maximiliano Fam MD (17 11:49:22)     Impression:     PROCEDURE NOTE --> Emergency Bedside Ultrasound    Procedure Name: FAST    Preformed by: Maximiliano Fam MD    Indication - Abdominal Pain-->  Suspicion of Intraperitoneal free fluid/hemorrhage, pericardial effusion    Probe: low frequency curvilinear probe    Windows - Hepatorenal,  Splenorenal, Suprapubic, Subxyphoid    Findings - No intraperitoneal free fluid, No significant pericardial effusion    Impression - Negative FAST exam    Images saved on ultrasound internal hard drive per protocol          Interventions:  2026 Dilaudid 0.5mg IV  2151 Dilaudid 0.5mg IV  2328 NS 0.1L IV Bolus   2343 Tylenol 975 mg PO    Emergency Department Course:  Past medical records, nursing notes, and vitals reviewed.  2005: I performed an exam of the patient and obtained history, as documented above.   IV inserted and blood drawn.   The patient was sent for an ultrasound while in the emergency department, findings above.     2242: I rechecked the patient. Explained findings to patient.     Findings and plan explained to the Patient who consents to admission.     2333: Discussed the patient with Dr. Nogueira, who will take the patient OR for emergent D&C.    Impression & Plan    Medical Decision Making:  Arpita Marcano is a 27 year old female with non-blighted ovum and incomplete miscarriage with worsening pain and bleeding. There is an impressive amount of bleeding on initial pelvic exam here. The FAST examination was done and was negative. Hemoglobin had dropped quite a bit during her ED stay. Blood pressure remained low especially in the semi-upright position done to the 70-80s. The blood pressure recovered with normal saline and ultimately with some blood given at decree of ongoing bleeding. The amount of loss at this point correlates to the symptoms hypotension. I will be contacted base on her US and her clinical course will take to operating room for a DNC or DNS. She is RH positive, no indication for Rhogam. We talked about the blood transfusion process and they were consenting to this.     Diagnosis:    ICD-10-CM    1. Incomplete miscarriage O03.4    2. Anemia due to blood loss, acute D62        Disposition:  Admitted to OR     Victor Hugo Cobian  5/30/2017   [facility] EMERGENCY DEPARTMENT  I, Victor Hugo  Aranza, am serving as a scribe at 8:05 PM on 5/30/2017 to document services personally performed by Maximiliano Fam MD based on my observations and the provider's statements to me.       Maximiliano Fam MD  05/31/17 8907

## 2017-05-31 NOTE — PLAN OF CARE
PRIMARY DIAGNOSIS/PROCEDURE:D. And C.  OUTPATIENT/OBSERVATION GOALS TO BE MET BEFORE DISCHARGE:    1. Stable vital signs Yes  2. Tolerating diet:Yes  3. Pain controlled with oral pain medications:  Yes  4. Positive bowel sounds:  Yes  5. Voiding without difficulty:  Yes  6. Able to ambulate:  Yes  7. Provider specific discharge goals met:  No    Will await am labs for hemoglobin check. Pt with little to no vaginal flow. Pt slept well between cares. Pt hopes to discharge later today. BP remains soft but stable.

## 2017-05-31 NOTE — ANESTHESIA POSTPROCEDURE EVALUATION
Patient: Arpita Marcano    Procedure(s):  DILATION AND CURETTAGE SUCTION  - Wound Class: II-Clean Contaminated    Diagnosis:retained products, incomplete    Diagnosis Additional Information: Incomplete  with hemorrhage  Dr. Nogueira    Anesthesia Type:  General, LMA    Note:  Anesthesia Post Evaluation    Patient location during evaluation: PACU  Patient participation: Able to fully participate in evaluation  Level of consciousness: awake  Pain management: adequate  Airway patency: patent  Cardiovascular status: acceptable  Respiratory status: acceptable  Hydration status: acceptable  PONV: none             Last vitals:  Vitals:    17 2321 17 2323 17 2347   BP: (!) 84/44  109/62   Pulse:      Resp:   22   Temp:   98  F (36.7  C)   SpO2: 96% 97%          Electronically Signed By: Ezio Levy MD  May 31, 2017  12:47 AM

## 2017-05-31 NOTE — PROGRESS NOTES
ROOM # [id number]    Living Situation (if not independent, order SW consult):  Facility name:  : [facility]    Activity level at baseline: ind  Activity level on admit: standby      Patient registered to observation; given Patient Bill of Rights; given the opportunity to ask questions about observation status and their plan of care.  Patient has been oriented to the observation room, bathroom and call light is in place.    Discussed discharge goals and expectations with patient/family.   OBSERVATION patient IN TIME: 0150

## 2017-05-31 NOTE — PLAN OF CARE
Problem: Discharge Planning  Goal: Discharge Planning (Adult, OB, Behavioral, Peds)  Outcome: Improving  PRIMARY DIAGNOSIS/PROCEDURE:D. And C.  OUTPATIENT/OBSERVATION GOALS TO BE MET BEFORE DISCHARGE: stable hgb     1. Stable vital signs Yes  2. Tolerating diet:Yes  3. Pain controlled with oral pain medications:  Yes  4. Positive bowel sounds:  Yes  5. Voiding without difficulty:  Yes  6. Able to ambulate:  Yes  7. Provider specific discharge goals met:  YES     Pt has little pain, plan to discharge today with stable hgb.

## 2017-05-31 NOTE — ANESTHESIA CARE TRANSFER NOTE
Patient: Arpita Marcano    Procedure(s):  DILATION AND CURETTAGE SUCTION  - Wound Class: II-Clean Contaminated    Diagnosis: retained products, incomplete    Diagnosis Additional Information: No value filed.    Anesthesia Type:   General, LMA     Note:  Airway :Face Mask  Patient transferred to:PACU  Comments: To PACU, Awake, VSS, SV, O2, Report to RN      Vitals: (Last set prior to Anesthesia Care Transfer)    CRNA VITALS  2017 0003 - 2017 0035      2017             EKG: NSR                Electronically Signed By: Dean Dennis Severson, APRN CRNA  May 31, 2017  12:35 AM

## 2017-05-31 NOTE — PLAN OF CARE
PRIMARY DIAGNOSIS/PROCEDURE: post d and c  OUTPATIENT/OBSERVATION GOALS TO BE MET BEFORE DISCHARGE:    1. Stable vital signs Yes  2. Tolerating diet:Yes  3. Pain controlled with oral pain medications:  No, Iv toradol in PACU  4. Positive bowel sounds:  Yes  5. Voiding without difficulty:  Yes  6. Able to ambulate:  Yes, standby assist  7. Provider specific discharge goals met:  No     pt arrived from the PACU with her significant other. Pt alert and oriented, scant vag flow.

## 2017-05-31 NOTE — ED NOTES
Changed pts. Soaked pad, and placed new Pad in place also placed pt. On bed pan to use bathroom. Pt. Stated her cramping is getting worse. MD notified.

## 2017-05-31 NOTE — H&P
[facility]    History and Physical  Obstetrics and Gynecology     Date of Admission:  2017    Assessment & Plan   Arpita Marcano is a 27 year old female who presents with incomplete  and continued vaginal bleeding leading to acute blood loss anemia    Summary:   - Incomplete : significant blood loss to this point, recommend emergent suction dilation and curettage. Discussed risks, benefits, and alternatives to the procedure including risk of infection, bleeding, blood clots, injury to adjacent organs, perforation, retained products. The patient's questions were answered, understanding confirmed, and the patient signed written informed consent.     - Acute blood loss anemia: secondary to incomplete , will recheck Hgb and plan to transfuse if Hgb < 7.0    Cornell Nogueira MD (pager [phone])  [facility]    Code Status   Full Code    Primary Care Physician   Physician No Ref-Primary    Chief Complaint    Vaginal bleeding    History is obtained from the patient    History of Present Illness   Arpita Marcano is a 27 year old female  @ 10w who presents to the [facility] ED with persistent heavy vaginal bleeding. She has a known non-viable pregnancy with blighted ovum on ultrasound on  at [facility]. She was seen in [facility] ED on  for bleeding and cramping, diagnosed with incomplete  and sent home with pain medication. Returned to clinic today to see Stacey Ch CNP due to some vaginal bleeding but no major passage of tissue, plan was to get ultrasound tomorrow and possible D&C for retained products. Bleeding became heavier and in ED tonight soaked through multiple pads. Pelvic US (images personally reviewed) shows gestational sac and fluid collection in lower uterine segment. Her Hgb has dropped from 12.5 to 10.3 over 2.5 hours. Her bhCG has also declined from 12k to 7k. Some dizziness when walking. Patient denies  headache, chest pain, shortness of breath, nausea, vomiting, diarrhea, dysuria, fever, weakness, increased swelling.    Past Medical History    I have reviewed this patient's medical history and updated it with pertinent information if needed.   History reviewed. No pertinent past medical history.    Past Surgical History   I have reviewed this patient's surgical history and updated it with pertinent information if needed.  Past Surgical History:   Procedure Laterality Date     BUNIONECTOMY       TONSILLECTOMY         Prior to Admission Medications   Prior to Admission Medications   Prescriptions Last Dose Informant Patient Reported? Taking?   HYDROcodone-acetaminophen (NORCO) 5-325 MG per tablet   No Yes   Sig: Take 1 tablet by mouth every 6 hours as needed for moderate to severe pain   ondansetron (ZOFRAN ODT) 4 MG ODT tab   No Yes   Sig: Take 1 tablet (4 mg) by mouth every 8 hours as needed for nausea      Facility-Administered Medications: None     Allergies   Allergies   Allergen Reactions     Penicillins        Social History   I have reviewed this patient's social history and updated it with pertinent information if needed. Arpita Marcano  reports that she has been smoking.  She does not have any smokeless tobacco history on file. She reports that she does not drink alcohol or use illicit drugs.    Family History   I have reviewed this patient's family history and updated it with pertinent information if needed.   History reviewed. No pertinent family history.    Review of Systems   The 10 point Review of Systems is negative other than noted in the HPI or here.    Physical Exam   Temp: 98.2  F (36.8  C)   BP: 90/53 Pulse: 115 Heart Rate: 115 Resp: 22 SpO2: 98 % O2 Device: None (Room air)    Vital Signs with Ranges  Temp:  [98.2  F (36.8  C)] 98.2  F (36.8  C)  Pulse:  [115] 115  Heart Rate:  [115] 115  Resp:  [22] 22  BP: ()/(46-76) 90/53  SpO2:  [97 %-100 %] 98 %  0 lbs 0 oz    Constitutional: awake,  alert, cooperative, no apparent distress, pale, shaking  Respiratory: breathing unlabored  Cardiovascular: tachycardic, regular rhythm  Genitounirinary: deferred  Skin/Extremities: pale, no lesions or bruising  Musculoskeletal: no lower extremity pitting edema present  Neurologic: Awake, alert, oriented to name, place and time.  Neuropsychiatric: mood stable, affect appropriate    Data   Results for orders placed or performed during the hospital encounter of 05/30/17 (from the past 24 hour(s))   Hemoglobin   Result Value Ref Range    Hemoglobin  11.7 - 15.7 g/dL     Canceled, Test credited   Duplicate request  CORRECTED ON 05/30 AT 2041: PREVIOUSLY REPORTED AS 12.7     HCG QUANTitative pregnancy (blood)   Result Value Ref Range    HCG Quantitative Serum 7876 (H) 0 - 5 IU/L   ABO/Rh type and screen   Result Value Ref Range    ABO O     RH(D)  Pos     Antibody Screen Neg     Specimen Expires 06/02/2017     Blood Bank Comment Duplicate request    CBC with platelets   Result Value Ref Range    WBC 9.3 4.0 - 11.0 10e9/L    RBC Count 4.30 3.8 - 5.2 10e12/L    Hemoglobin 12.5 11.7 - 15.7 g/dL    Hematocrit 37.4 35.0 - 47.0 %    MCV 87 78 - 100 fl    MCH 29.1 26.5 - 33.0 pg    MCHC 33.4 31.5 - 36.5 g/dL    RDW 11.9 10.0 - 15.0 %    Platelet Count 238 150 - 450 10e9/L   US OB < 14 Weeks Single    Narrative    OBSTETRIC ULTRASOUND WITH ENDOVAGINAL TRANSDUCER    5/30/2017 10:21 PM      HISTORY: Known blighted ovum. Significant increase in pain and  bleeding.    TECHNIQUE:  Endovaginal sonography was added to the transabdominal  exam to better evaluate the uterus and ovaries because of inadequate  bladder fullness.    COMPARISON: 5/28/2017    FINDINGS: The previously identified gestational sac with a blighted  ovum is no longer visible, but the endometrium is heterogeneous in  appearance. In the lower uterine segment there is a teardrop-shaped  fluid collection consistent with incomplete passage of gestational  sac.    Right  ovary appears normal. Left ovary is not seen. No adnexal masses  or free fluid.      Impression    IMPRESSION: Appearance of blighted ovum noted previously has  progressed to incomplete .    DERIK OWENS MD   Hemoglobin   Result Value Ref Range    Hemoglobin 10.3 (L) 11.7 - 15.7 g/dL

## 2017-05-31 NOTE — ED NOTES
"Pt arrives to ED for vaginal bleeding and fainting episode out in triage. Pt was seen a couple days ago for vaginal bleeding but it has gotten increasingly worse. Pt has felt lightheaded and has had \"fainting\" episodes. Pt has known miscarriage.  Pt pale on arrival.   "

## 2017-05-31 NOTE — ANESTHESIA PREPROCEDURE EVALUATION
Anesthesia Evaluation     . Pt has had prior anesthetic. Type: General    No history of anesthetic complications          ROS/MED HX    ENT/Pulmonary:     (+)RADHA risk factors obese, , . .    Neurologic:  - neg neurologic ROS     Cardiovascular:  - neg cardiovascular ROS       METS/Exercise Tolerance:     Hematologic:  - neg hematologic  ROS       Musculoskeletal:  - neg musculoskeletal ROS       GI/Hepatic:  - neg GI/hepatic ROS       Renal/Genitourinary:  - ROS Renal section negative       Endo: Comment: Class 2 obesity    (+) Obesity, .      Psychiatric:  - neg psychiatric ROS       Infectious Disease:  - neg infectious disease ROS       Malignancy:      - no malignancy   Other:    - neg other ROS                 Physical Exam  Normal systems: cardiovascular, pulmonary and dental    Airway   Mallampati: II  TM distance: >3 FB  Neck ROM: full    Dental     Cardiovascular   Rhythm and rate: regular and normal      Pulmonary    breath sounds clear to auscultation    Other findings: Lab Test        05/30/17 05/30/17 05/28/17 2257 2005          0920          WBC           --          9.3          6.1           HGB          10.3*        Canceled, T* 13.5          MCV           --          87           88            PLT           --          238          193            Lab Test        05/28/17                       0920          NA           137           POTASSIUM    4.1           CHLORIDE     107           CO2          26            BUN          11            CR           0.78          ANIONGAP     4             ERIC          8.9           GLC          105*                                 Anesthesia Plan      History & Physical Review  History and physical reviewed and following examination; no interval change.    ASA Status:  2 emergent.    NPO Status:  > 6 hours    Plan for General and LMA with Intravenous induction. Maintenance will be Balanced.    PONV prophylaxis:   Ondansetron (or other 5HT-3) and Dexamethasone or Solumedrol       Postoperative Care  Postoperative pain management:  IV analgesics, Oral pain medications and Multi-modal analgesia.      Consents  Anesthetic plan, risks, benefits and alternatives discussed with:  Patient.  Use of blood products discussed: No .   .                          .

## 2017-05-31 NOTE — OP NOTE
[facility]  Operative Note    Patient: Arpita Marcano  : 1990  MRN: [id number]    Date of Service: 2017    Pre-operative diagnosis:  - Incomplete   - Acute blood loss anemia  - Active uterine hemorrhage    Post-operative diagnosis:  - Incomplete   - Acute blood loss anemia requiring blood transfusion  - Hemorrhage resolved    Procedure:   - Suction dilation and curettage    Surgeon: Cornell Nogueira MD    Anesthesia: General    Antibiotics: doxycycline    EBL: 520 mL  Urine: 30 mL clear  Fluids: 1 unit pRBCs    Specimens: products of conception  Complications: none    Findings: EUA: 500 mL blood and clot evacuated from vagina, uterus 7w size, midline, anteverted, normal cervix and os dilated to 9 mm, normal vagina    Indications: Arpita Marcano is a 27 year old female who had known incomplete  of blighted ovum, and developed severe vaginal bleeding. Pelvic US showed retained product of conception. Decision made to proceed with suction dilation and curettage for completion and treatment of bleeding. Discussed risks, benefits, and alternatives to the procedure including risk of infection, bleeding, damage to local organs, blood clots, perforation. The patient's questions were answered, understanding confirmed, and the patient signed written informed consent.    Technique: The patient was taken to the operating room and placed on the table in dorsal lithotomy position with legs in yellow-fin stirrups. General anesthesia was administered. An EUA was performed with evacuation of 500 mL blood and clot from the vagina. The patient was prepped and draped in the usual sterile fashion. A time-out was performed. One unit of pRBCs was transfused.    The bladder was drained with a straight catheter. A speculum was placed in the vagina. A tenaculum was placed on the anterior lip of the cervix.    The cervix was already dilated to 9 mm. A 8 mm curved suction curet was inserted  through the cervix to the uterine fundus and suction applied to 60 PSI. The curet was rotated in the uterine cavity with noted return of tissue. This process was repeated until no tissue was returned. A sharp curet was used on all aspects of the uterine cavity with minimal return of tissue and a gritty texture throughout. The suction contents were sent to pathology for examination. The tenaculum was removed from the cervix and good hemostasis noted. The speculum was removed from the vagina. Bimanual massage yielded small, firm uterus with no significant bleeding. The patient received methylergonovine 0.2 mg IM.    Final vaginal exam yielded no foreign objects. Instrument, needle, and sponge counts were correct times 2. The patient was transferred to the PACU in stable condition.    Cornell Nogueira MD

## 2017-06-01 LAB — COPATH REPORT: NORMAL

## 2017-10-16 LAB
HBV SURFACE AG SERPL QL IA: NONREACTIVE
HIV 1+2 AB+HIV1 P24 AG SERPL QL IA: NONREACTIVE
RUBELLA ANTIBODY IGG QUANTITATIVE: NORMAL IU/ML
RUBELLA ANTIBODY IGG QUANTITATIVE: NORMAL IU/ML
T PALLIDUM IGG SER QL: NONREACTIVE

## 2017-11-21 ENCOUNTER — HOSPITAL ENCOUNTER (EMERGENCY)
Facility: CLINIC | Age: 27
Discharge: HOME | End: 2017-11-21
Attending: EMERGENCY MEDICINE | Admitting: EMERGENCY MEDICINE
Payer: COMMERCIAL

## 2017-11-21 ENCOUNTER — APPOINTMENT (OUTPATIENT)
Dept: ULTRASOUND IMAGING | Facility: CLINIC | Age: 27
End: 2017-11-21
Attending: EMERGENCY MEDICINE
Payer: COMMERCIAL

## 2017-11-21 VITALS
SYSTOLIC BLOOD PRESSURE: 108 MMHG | DIASTOLIC BLOOD PRESSURE: 61 MMHG | WEIGHT: 215 LBS | HEIGHT: 65 IN | OXYGEN SATURATION: 100 % | RESPIRATION RATE: 16 BRPM | BODY MASS INDEX: 35.82 KG/M2 | HEART RATE: 86 BPM | TEMPERATURE: 97.9 F

## 2017-11-21 DIAGNOSIS — R10.9 LEFT FLANK PAIN: ICD-10-CM

## 2017-11-21 LAB
ALBUMIN SERPL-MCNC: 3.3 G/DL (ref 3.4–5)
ALP SERPL-CCNC: 74 U/L (ref 40–150)
ALT SERPL W P-5'-P-CCNC: 16 U/L (ref 0–50)
ANION GAP SERPL CALCULATED.3IONS-SCNC: 7 MMOL/L (ref 3–14)
AST SERPL W P-5'-P-CCNC: 13 U/L (ref 0–45)
BASOPHILS # BLD AUTO: 0 10E9/L (ref 0–0.2)
BASOPHILS NFR BLD AUTO: 0.2 %
BILIRUB SERPL-MCNC: 0.5 MG/DL (ref 0.2–1.3)
BUN SERPL-MCNC: 6 MG/DL (ref 7–30)
CALCIUM SERPL-MCNC: 8.9 MG/DL (ref 8.5–10.1)
CHLORIDE SERPL-SCNC: 105 MMOL/L (ref 94–109)
CO2 SERPL-SCNC: 25 MMOL/L (ref 20–32)
CREAT SERPL-MCNC: 0.68 MG/DL (ref 0.52–1.04)
DIFFERENTIAL METHOD BLD: ABNORMAL
EOSINOPHIL # BLD AUTO: 0 10E9/L (ref 0–0.7)
EOSINOPHIL NFR BLD AUTO: 0.2 %
ERYTHROCYTE [DISTWIDTH] IN BLOOD BY AUTOMATED COUNT: 16.1 % (ref 10–15)
GFR SERPL CREATININE-BSD FRML MDRD: >90 ML/MIN/1.7M2
GLUCOSE SERPL-MCNC: 90 MG/DL (ref 70–99)
HCT VFR BLD AUTO: 39.1 % (ref 35–47)
HGB BLD-MCNC: 13 G/DL (ref 11.7–15.7)
IMM GRANULOCYTES # BLD: 0 10E9/L (ref 0–0.4)
IMM GRANULOCYTES NFR BLD: 0.3 %
LIPASE SERPL-CCNC: 89 U/L (ref 73–393)
LYMPHOCYTES # BLD AUTO: 1.3 10E9/L (ref 0.8–5.3)
LYMPHOCYTES NFR BLD AUTO: 14.3 %
MCH RBC QN AUTO: 27.5 PG (ref 26.5–33)
MCHC RBC AUTO-ENTMCNC: 33.2 G/DL (ref 31.5–36.5)
MCV RBC AUTO: 83 FL (ref 78–100)
MONOCYTES # BLD AUTO: 0.4 10E9/L (ref 0–1.3)
MONOCYTES NFR BLD AUTO: 4.2 %
NEUTROPHILS # BLD AUTO: 7.1 10E9/L (ref 1.6–8.3)
NEUTROPHILS NFR BLD AUTO: 80.8 %
NRBC # BLD AUTO: 0 10*3/UL
NRBC BLD AUTO-RTO: 0 /100
PLATELET # BLD AUTO: 197 10E9/L (ref 150–450)
POTASSIUM SERPL-SCNC: 4.2 MMOL/L (ref 3.4–5.3)
PROT SERPL-MCNC: 7.3 G/DL (ref 6.8–8.8)
RBC # BLD AUTO: 4.73 10E12/L (ref 3.8–5.2)
SODIUM SERPL-SCNC: 137 MMOL/L (ref 133–144)
SPECIMEN SOURCE: NORMAL
WBC # BLD AUTO: 8.8 10E9/L (ref 4–11)
WET PREP SPEC: NORMAL

## 2017-11-21 PROCEDURE — 99284 EMERGENCY DEPT VISIT MOD MDM: CPT | Mod: 25

## 2017-11-21 PROCEDURE — 85025 COMPLETE CBC W/AUTO DIFF WBC: CPT | Performed by: EMERGENCY MEDICINE

## 2017-11-21 PROCEDURE — 83690 ASSAY OF LIPASE: CPT | Performed by: EMERGENCY MEDICINE

## 2017-11-21 PROCEDURE — 87491 CHLMYD TRACH DNA AMP PROBE: CPT | Performed by: EMERGENCY MEDICINE

## 2017-11-21 PROCEDURE — 25000132 ZZH RX MED GY IP 250 OP 250 PS 637: Performed by: EMERGENCY MEDICINE

## 2017-11-21 PROCEDURE — 76770 US EXAM ABDO BACK WALL COMP: CPT

## 2017-11-21 PROCEDURE — 87591 N.GONORRHOEAE DNA AMP PROB: CPT | Performed by: EMERGENCY MEDICINE

## 2017-11-21 PROCEDURE — 76815 OB US LIMITED FETUS(S): CPT

## 2017-11-21 PROCEDURE — 80053 COMPREHEN METABOLIC PANEL: CPT | Performed by: EMERGENCY MEDICINE

## 2017-11-21 PROCEDURE — 87210 SMEAR WET MOUNT SALINE/INK: CPT | Performed by: EMERGENCY MEDICINE

## 2017-11-21 RX ORDER — ACETAMINOPHEN 325 MG/1
650 TABLET ORAL ONCE
Status: COMPLETED | OUTPATIENT
Start: 2017-11-21 | End: 2017-11-21

## 2017-11-21 RX ADMIN — ACETAMINOPHEN 650 MG: 325 TABLET, FILM COATED ORAL at 10:42

## 2017-11-21 ASSESSMENT — ENCOUNTER SYMPTOMS
FLANK PAIN: 1
DIARRHEA: 0
FEVER: 0
NAUSEA: 0
DYSURIA: 1
ABDOMINAL PAIN: 1
VOMITING: 0

## 2017-11-21 NOTE — ED PROVIDER NOTES
History     Chief Complaint:  Abdominal pain    HPI   Arpita Marcano is a 27 year old 15 week pregnant female who presents with abdominal pain. The patient reports that she awoke this morning at 0500 with a sharp flank pain. During this time she had some dysuria and difficulty due to the flank pain. She waited until 0700 with no relief of her symptoms and went to Urgent Care for evaluation. She had a negative urinalysis at urgent care and was found to have no evidence of a UTI so she was referred to the emergency department. She has not taken any ibuprofen for her pain. Here in the emergency department the patient is having some left sided abdominal and left sided flank pain. The patient did note that she has had increased vaginal discharge since yesterday. She notes that she was fighting a cold with a fever last week but that she has had no fevers today. She denies having any history of kidney stones or simal pain. She notes that she had one miscarriage at 10 weeks earlier this year and has had no other pregnancies in the past.    Allergies:  Penicillins    Medications:    The patient is not currently taking any prescribed medications.     Past Medical History:    The patient denies any relevant past medical history.     Past Surgical History:    Bunionectomy  D&C  Tonsillectomy    Family History:    The patient denies any relevant family medical history.     Social History:  Smoking Status: Yes  Smokeless Tobacco: No  Alcohol Use: No   Marital Status:  Single [1]    Review of Systems   Constitutional: Negative for fever.   Gastrointestinal: Positive for abdominal pain. Negative for diarrhea, nausea and vomiting.   Genitourinary: Positive for dysuria and flank pain.   All other systems reviewed and are negative.    Physical Exam   Vitals:  Patient Vitals for the past 24 hrs:   BP Temp Pulse Heart Rate Resp SpO2 Height Weight   11/21/17 1158 - - 86 - 16 - - -   11/21/17 1151 - - - - - 100 % - -   11/21/17 1150  "108/61 - - - - - - -   11/21/17 1030 110/74 - - - - 99 % - -   11/21/17 1015 115/74 - - - - 99 % - -   11/21/17 0915 124/72 97.9  F (36.6  C) - 103 16 99 % 1.651 m (5' 5\") 97.5 kg (215 lb)        Physical Exam  Nursing note and vitals reviewed.  Constitutional: Cooperative.   HENT:   Mouth/Throat: Moist mucous membranes.   Eyes: EOMI, nonicteric sclera  Cardiovascular: Normal rate (by time of my exam), regular rhythm, no murmurs, rubs, or gallops  Pulmonary/Chest: Effort normal and breath sounds normal. No respiratory distress. No wheezes. No rales.   Abdominal: Soft. Nontender, nondistended, no guarding or rigidity. BS present.   Musculoskeletal: Normal range of motion.   Neurological: Alert. Moves all extremities spontaneously.   Skin: Skin is warm and dry. No rash noted.   Psychiatric: Normal mood and affect.     Emergency Department Course     Imaging:  Radiology findings were communicated with the patient who voiced understanding of the findings.  US renal complete:  FINDINGS: The bilateral renal parenchyma are normal in echogenicity  without evidence for shadowing stone or mass. No renal cysts. No  hydronephrosis. Right kidney measures 11.2 x 5.6 x 5.1 cm and the left  measures 10.7 x 5.2 x 5.8 cm. Cortical thickness is 1.3 cm on the  right and 1.3 cm on the left.  Bladder is unremarkable given its level  of distention.  Reading per radiology.     US OB limited one or more fetuses  IMPRESSION:   1. There is a single, living intrauterine pregnancy in the breech  presentation.   2. The ultrasound gestational age is 15 weeks 1 day.   3. The ultrasound MOIRA is 5/14/2018.   4. The gestational age, based on the last menstrual period, is 14  weeks 5 days.   Reading per radiology.     Laboratory:  Laboratory findings were communicated with the patient who voiced understanding of the findings.  CBC: RDW: 16.1(H), o/w WNL (WBC 8.8, HGB 13.0, )  CMP: BUN: 6(L), Albumin: 3.3(L), o/w WNL (Creatinine 0.68)   Lipase: " 89  Wet prep:  Chlamydia trachomatis PCR: pending   Neisseria Gonorrhea PCR: pending    Interventions:  1042 Tylenol 650 mg oral    Emergency Department Course:  Nursing notes and vitals reviewed.  I performed an exam of the patient as documented above.   IV was inserted and blood was drawn for laboratory testing, results above.  The patient was sent for a US while in the emergency department, results above.      1048 I rechecked with the patient    I discussed the treatment plan with the patient. They expressed understanding of this plan and consented to discharge. They will be discharged home with instructions for care and follow up. In addition, the patient will return to the emergency department if their symptoms persist, worsen, if new symptoms arise or if there is any concern.  All questions were answered.     I personally reviewed the laboratory results with the Patient and answered all related questions prior to discharge.    Impression & Plan      Medical Decision Making:   Arpita Marcano is a 27 year old female who presents with left sided flank pain.  Associated symptoms include nausea.   A broad differential diagnosis was considered including diverticulitis, ureterolithiasis, tumor, colitis, cholecystitis, aneurysm, dissection, hydronephrosis, pneumonia, rib fracture, UTI, pyelonephritis, pregnancy complication amongst many other etiologies.     The workup in the ED is at this point negative.  No etiology for the patients pain is found at this point and my suspicion of an intraabdominal or intrathoracic catastrophe or other worrisome etiology is very low.  I will not therefore admit for serial exams and further workup.  Patient is hemodynamically stable in ED. Plan is home with flank pain recheck by primary care physician/obgyn or return to ED at that time.  Return for fevers greater than 102, increasing pain, other new symptoms develop.  Flank pain handout given.  Questions were answered.       Diagnosis:    ICD-10-CM    1. Left flank pain R10.9         Disposition:   Discharged    CMS Diagnoses: None     Scribe Disclosure:  I, Vikas Ponce, am serving as a scribe at 9:29 AM on 11/21/2017 to document services personally performed by Yusef Carcamo MD, based on my observations and the provider's statements to me.   [facility] EMERGENCY DEPARTMENT       Yusef Carcamo MD  11/23/17 0314

## 2017-11-21 NOTE — ED NOTES
PT is 15 wks pregnant. Woke up this morning at 530 with LLQ and L flank pain. Pt went to urgent care. States she was negative for UTI, was sent here. Pt denies n/v, discharge, bleeding. ABC intact. A/o x4.

## 2017-11-21 NOTE — ED AVS SNAPSHOT
[facility] Emergency Department    [address]    [facility] [address]    Phone:  [phone]    Fax:  [phone]                                       Arpita Marcano   MRN: [id number]    Department:  [facility] Emergency Department   Date of Visit:  11/21/2017           Patient Information     Date Of Birth          1990        Your diagnoses for this visit were:     Left flank pain        You were seen by Yusef Carcamo MD.      Follow-up Information     Follow up with obgyn.    Why:  2-3 days for recheck         Follow up with [facility] Emergency Department.    Specialty:  EMERGENCY MEDICINE    Why:  As needed, If symptoms worsen    Contact information:    [address]  [facility] [address] [phone]      Discharge References/Attachments     FLANK PAIN, UNCERTAIN CAUSE (ENGLISH)      24 Hour Appointment Hotline       To make an appointment at any [place] clinic, call [phone] ([phone]). If you don't have a family doctor or clinic, we will help you find one. [place] clinics are conveniently located to serve the needs of you and your family.             Review of your medicines      Notice     You have not been prescribed any medications.            Procedures and tests performed during your visit     CBC with platelets differential    Chlamydia trachomatis PCR    Comprehensive metabolic panel    Lipase    Neisseria gonorrhoeae PCR    US OB Limited One Or More Fetuses    US Renal Complete    Wet prep      Orders Needing Specimen Collection     None      Pending Results     Date and Time Order Name Status Description    11/21/2017 1054 Neisseria gonorrhoeae PCR In process     11/21/2017 1054 Chlamydia trachomatis PCR In process             Pending Culture Results     Date and Time Order Name Status Description    11/21/2017 1054 Neisseria gonorrhoeae PCR In process     11/21/2017 1054 Chlamydia trachomatis PCR In  process             Pending Results Instructions     If you had any lab results that were not finalized at the time of your Discharge, you can call the ED Lab Result RN at [phone]. You will be contacted by this team for any positive Lab results or changes in treatment. The nurses are available 7 days a week from 10A to 6:30P.  You can leave a message 24 hours per day and they will return your call.        Test Results From Your Hospital Stay        11/21/2017 10:25 AM      Component Results     Component Value Ref Range & Units Status    WBC 8.8 4.0 - 11.0 10e9/L Final    RBC Count 4.73 3.8 - 5.2 10e12/L Final    Hemoglobin 13.0 11.7 - 15.7 g/dL Final    Hematocrit 39.1 35.0 - 47.0 % Final    MCV 83 78 - 100 fl Final    MCH 27.5 26.5 - 33.0 pg Final    MCHC 33.2 31.5 - 36.5 g/dL Final    RDW 16.1 (H) 10.0 - 15.0 % Final    Platelet Count 197 150 - 450 10e9/L Final    Diff Method Automated Method  Final    % Neutrophils 80.8 % Final    % Lymphocytes 14.3 % Final    % Monocytes 4.2 % Final    % Eosinophils 0.2 % Final    % Basophils 0.2 % Final    % Immature Granulocytes 0.3 % Final    Nucleated RBCs 0 0 /100 Final    Absolute Neutrophil 7.1 1.6 - 8.3 10e9/L Final    Absolute Lymphocytes 1.3 0.8 - 5.3 10e9/L Final    Absolute Monocytes 0.4 0.0 - 1.3 10e9/L Final    Absolute Eosinophils 0.0 0.0 - 0.7 10e9/L Final    Absolute Basophils 0.0 0.0 - 0.2 10e9/L Final    Abs Immature Granulocytes 0.0 0 - 0.4 10e9/L Final    Absolute Nucleated RBC 0.0  Final         11/21/2017 10:46 AM      Component Results     Component Value Ref Range & Units Status    Sodium 137 133 - 144 mmol/L Final    Potassium 4.2 3.4 - 5.3 mmol/L Final    Chloride 105 94 - 109 mmol/L Final    Carbon Dioxide 25 20 - 32 mmol/L Final    Anion Gap 7 3 - 14 mmol/L Final    Glucose 90 70 - 99 mg/dL Final    Urea Nitrogen 6 (L) 7 - 30 mg/dL Final    Creatinine 0.68 0.52 - 1.04 mg/dL Final    GFR Estimate >90 >60 mL/min/1.7m2 Final    Non African  American GFR Calc    GFR Estimate If Black >90 >60 mL/min/1.7m2 Final    African American GFR Calc    Calcium 8.9 8.5 - 10.1 mg/dL Final    Bilirubin Total 0.5 0.2 - 1.3 mg/dL Final    Albumin 3.3 (L) 3.4 - 5.0 g/dL Final    Protein Total 7.3 6.8 - 8.8 g/dL Final    Alkaline Phosphatase 74 40 - 150 U/L Final    ALT 16 0 - 50 U/L Final    AST 13 0 - 45 U/L Final         11/21/2017 10:46 AM      Component Results     Component Value Ref Range & Units Status    Lipase 89 73 - 393 U/L Final                     11/21/2017 11:01 AM      Narrative     ULTRASOUND OBSTETRIC LIMITED ONE OR MORE FETUSES 11/21/2017 10:17 AM    CLINICAL HISTORY: Bilateral flank pain, left greater than right, left  lower quadrant abdominal pain.      COMPARISON:    FINDINGS: There is a single, live intrauterine pregnancy in  longitudinal lie and breech presentation.     Heart Rate: 153 BPM and regular rhythm.  Fetal Motion: Normal.  Placenta: Posterior. The placenta is within 2 cm of the cervical os.   Cervix: Not measured.  KATARINA: Normal.    Measured Parameters:      BPD:  2.9 cm consistent with 15 weeks 1 day mean gestational age.        HC:  10.7 cm consistent with 15 weeks 1 day mean gestational  age.        AC:  8.9 cm consistent with 15 weeks 1 day mean gestational age.         FL:  1.6 cm consistent with 14 weeks 5 days mean gestational  age.      Gestational Age: By Current Ultrasound: 15 weeks 1 day mean  gestational age.   Estimated Fetal Weight: 110 g which is 75th percentile based on LMP.        Impression     IMPRESSION:   1. There is a single, living intrauterine pregnancy in the breech  presentation.   2. The ultrasound gestational age is 15 weeks 1 day.   3. The ultrasound MOIRA is 5/14/2018.   4. The gestational age, based on the last menstrual period, is 14  weeks 5 days.     SRAVANI MORGAN MD         11/21/2017 11:01 AM      Narrative     ULTRASOUND RETROPERITONEAL COMPLETE 11/21/2017 10:18 AM     HISTORY: Bilateral flank pain,  left greater than right, 15 weeks  pregnant.     COMPARISON: None.    FINDINGS: The bilateral renal parenchyma are normal in echogenicity  without evidence for shadowing stone or mass. No renal cysts. No  hydronephrosis. Right kidney measures 11.2 x 5.6 x 5.1 cm and the left  measures 10.7 x 5.2 x 5.8 cm. Cortical thickness is 1.3 cm on the  right and 1.3 cm on the left.  Bladder is unremarkable given its level  of distention.        Impression     IMPRESSION: Normal bilateral renal ultrasound.    SRAVANI MORGAN MD         11/21/2017 11:45 AM      Component Results     Component    Specimen Description    Vagina    Wet Prep    Few  PMNs seen      Wet Prep    No yeast seen    Wet Prep    No Trichomonas seen    Wet Prep    No clue cells seen         11/21/2017 11:14 AM         11/21/2017 11:14 AM                Clinical Quality Measure: Blood Pressure Screening     Your blood pressure was checked while you were in the emergency department today. The last reading we obtained was  BP: 110/74 . Please read the guidelines below about what these numbers mean and what you should do about them.  If your systolic blood pressure (the top number) is less than 120 and your diastolic blood pressure (the bottom number) is less than 80, then your blood pressure is normal. There is nothing more that you need to do about it.  If your systolic blood pressure (the top number) is 120-139 or your diastolic blood pressure (the bottom number) is 80-89, your blood pressure may be higher than it should be. You should have your blood pressure rechecked within a year by a primary care provider.  If your systolic blood pressure (the top number) is 140 or greater or your diastolic blood pressure (the bottom number) is 90 or greater, you may have high blood pressure. High blood pressure is treatable, but if left untreated over time it can put you at risk for heart attack, stroke, or kidney failure. You should have your blood pressure rechecked  by a primary care provider within the next 4 weeks.  If your provider in the emergency department today gave you specific instructions to follow-up with your doctor or provider even sooner than that, you should follow that instruction and not wait for up to 4 weeks for your follow-up visit.        Thank you for choosing [facility]       Thank you for choosing [facility] for your care. Our goal is always to provide you with excellent care. Hearing back from our patients is one way we can continue to improve our services. Please take a few minutes to complete the written survey that you may receive in the mail after you visit with us. Thank you!        [facility] Information     [facility] gives you secure access to your electronic health record. If you see a primary care provider, you can also send messages to your care team and make appointments. If you have questions, please call your primary care clinic.  If you do not have a primary care provider, please call [phone] and they will assist you.        Care EveryWhere ID     This is your Care EveryWhere ID. This could be used by other organizations to access your [facility] medical records  [id number]        Equal Access to Services     GA VOGEL : Hadfelipa Brian, wajenda maria esther, qaybta kaalwesley akins, ej grimaldo . So [facility] [phone].    ATENCIÓN: Si habla español, tiene a wynne disposición servicios gratuitos de asistencia lingüística. Llame al [phone].    We comply with applicable federal civil rights laws and [place] laws. We do not discriminate on the basis of race, color, national origin, age, disability, sex, sexual orientation, or gender identity.            After Visit Summary       This is your record. Keep this with you and show to your community pharmacist(s) and doctor(s) at your next visit.

## 2017-11-21 NOTE — ED AVS SNAPSHOT
[facility] Emergency Department    [address]    [facility] [address]    Phone:  [phone]    Fax:  [phone]                                       Arpita Marcano   MRN: [id number]    Department:  [facility] Emergency Department   Date of Visit:  11/21/2017           After Visit Summary Signature Page     I have received my discharge instructions, and my questions have been answered. I have discussed any challenges I see with this plan with the nurse or doctor.    ..........................................................................................................................................  Patient/Patient Representative Signature      ..........................................................................................................................................  Patient Representative Print Name and Relationship to Patient    ..................................................               ................................................  Date                                            Time    ..........................................................................................................................................  Reviewed by Signature/Title    ...................................................              ..............................................  Date                                                            Time

## 2017-11-22 LAB
C TRACH DNA SPEC QL NAA+PROBE: NEGATIVE
N GONORRHOEA DNA SPEC QL NAA+PROBE: NEGATIVE
SPECIMEN SOURCE: NORMAL
SPECIMEN SOURCE: NORMAL

## 2018-01-21 ENCOUNTER — HEALTH MAINTENANCE LETTER (OUTPATIENT)
Age: 28
End: 2018-01-21

## 2018-04-27 LAB — GROUP B STREP PCR: POSITIVE

## 2018-05-09 ENCOUNTER — HOSPITAL ENCOUNTER (OUTPATIENT)
Facility: CLINIC | Age: 28
Discharge: HOME | End: 2018-05-09
Attending: OBSTETRICS & GYNECOLOGY | Admitting: OBSTETRICS & GYNECOLOGY
Payer: COMMERCIAL

## 2018-05-09 VITALS
TEMPERATURE: 97.8 F | OXYGEN SATURATION: 96 % | DIASTOLIC BLOOD PRESSURE: 80 MMHG | HEIGHT: 65 IN | SYSTOLIC BLOOD PRESSURE: 129 MMHG

## 2018-05-09 LAB
ALT SERPL W P-5'-P-CCNC: 15 U/L (ref 0–50)
AST SERPL W P-5'-P-CCNC: 16 U/L (ref 0–45)
CREAT SERPL-MCNC: 0.69 MG/DL (ref 0.52–1.04)
CREAT UR-MCNC: 85 MG/DL
ERYTHROCYTE [DISTWIDTH] IN BLOOD BY AUTOMATED COUNT: 15.1 % (ref 10–15)
GFR SERPL CREATININE-BSD FRML MDRD: >90 ML/MIN/1.7M2
HCT VFR BLD AUTO: 33.2 % (ref 35–47)
HGB BLD-MCNC: 10.5 G/DL (ref 11.7–15.7)
MCH RBC QN AUTO: 26.1 PG (ref 26.5–33)
MCHC RBC AUTO-ENTMCNC: 31.6 G/DL (ref 31.5–36.5)
MCV RBC AUTO: 82 FL (ref 78–100)
PLATELET # BLD AUTO: 189 10E9/L (ref 150–450)
PROT UR-MCNC: 0.2 G/L
PROT/CREAT 24H UR: 0.24 G/G CR (ref 0–0.2)
RBC # BLD AUTO: 4.03 10E12/L (ref 3.8–5.2)
URATE SERPL-MCNC: 3.7 MG/DL (ref 2.6–6)
WBC # BLD AUTO: 11.1 10E9/L (ref 4–11)

## 2018-05-09 PROCEDURE — 84460 ALANINE AMINO (ALT) (SGPT): CPT | Performed by: OBSTETRICS & GYNECOLOGY

## 2018-05-09 PROCEDURE — 36415 COLL VENOUS BLD VENIPUNCTURE: CPT | Performed by: OBSTETRICS & GYNECOLOGY

## 2018-05-09 PROCEDURE — G0463 HOSPITAL OUTPT CLINIC VISIT: HCPCS | Mod: 25

## 2018-05-09 PROCEDURE — 84156 ASSAY OF PROTEIN URINE: CPT | Performed by: OBSTETRICS & GYNECOLOGY

## 2018-05-09 PROCEDURE — 85027 COMPLETE CBC AUTOMATED: CPT | Performed by: OBSTETRICS & GYNECOLOGY

## 2018-05-09 PROCEDURE — 84450 TRANSFERASE (AST) (SGOT): CPT | Performed by: OBSTETRICS & GYNECOLOGY

## 2018-05-09 PROCEDURE — 82565 ASSAY OF CREATININE: CPT | Performed by: OBSTETRICS & GYNECOLOGY

## 2018-05-09 PROCEDURE — 84550 ASSAY OF BLOOD/URIC ACID: CPT | Performed by: OBSTETRICS & GYNECOLOGY

## 2018-05-09 PROCEDURE — 59025 FETAL NON-STRESS TEST: CPT

## 2018-05-09 RX ORDER — ACETAMINOPHEN 325 MG/1
975 TABLET ORAL ONCE
Status: DISCONTINUED | OUTPATIENT
Start: 2018-05-09 | End: 2018-05-09 | Stop reason: HOSPADM

## 2018-05-09 RX ORDER — ONDANSETRON 2 MG/ML
4 INJECTION INTRAMUSCULAR; INTRAVENOUS EVERY 6 HOURS PRN
Status: DISCONTINUED | OUTPATIENT
Start: 2018-05-09 | End: 2018-05-09 | Stop reason: HOSPADM

## 2018-05-09 RX ORDER — ACETAMINOPHEN 325 MG/1
650 TABLET ORAL EVERY 4 HOURS PRN
Status: DISCONTINUED | OUTPATIENT
Start: 2018-05-09 | End: 2018-05-09 | Stop reason: HOSPADM

## 2018-05-09 NOTE — PROGRESS NOTES
"TRIAGE/OUTPATIENT evaluation    Subjective: Patient reports she woke up at 2am with a headache. Does get headaches off and on in pregnancy but this one was worse. She took tylenol and fell back to sleep for an hour or so until her boyfriend got up for work. Headache still present, but less, no vision changes. And feeling like she couldn't catch her breath. Sent in for evaluation. No history of migraines, no vision changes, no epigastric or right upper quadrant pain except fetal positioning.     Objective:  /83  Temp 97.8  F (36.6  C) (Oral)  Ht 1.651 m (5' 5\")  SpO2 97%   FHT: 145, mod carrie, accels  [place]: rare contraction    Abdomen: gravid, non tender epigastric or upper abdominal pain  Lower extremities: +2 edema symmetric bilateral, +1 DTR at patella, no clonus    Labs:  Component      Latest Ref Rng & Units 5/9/2018   WBC      4.0 - 11.0 10e9/L 11.1 (H)   RBC Count      3.8 - 5.2 10e12/L 4.03   Hemoglobin      11.7 - 15.7 g/dL 10.5 (L)   Hematocrit      35.0 - 47.0 % 33.2 (L)   MCV      78 - 100 fl 82   MCH      26.5 - 33.0 pg 26.1 (L)   MCHC      31.5 - 36.5 g/dL 31.6   RDW      10.0 - 15.0 % 15.1 (H)   Platelet Count      150 - 450 10e9/L 189   Creatinine      0.52 - 1.04 mg/dL 0.69   GFR Estimate      >60 mL/min/1.7m2 >90   GFR Estimate If Black      >60 mL/min/1.7m2 >90   Protein Random Urine      g/L 0.20   Protein Total Urine g/gr Creatinine      0 - 0.2 g/g Cr 0.24 (H)   AST      0 - 45 U/L 16   ALT      0 - 50 U/L 15   Uric Acid      2.6 - 6.0 mg/dL 3.7   Creatinine Urine      mg/dL 85     Assessment and Plan:  Headache: improved with home tylenol 8 hours ago. Will give additional dose now. Discussed warning signs of preeclampsia. No lab abnormalities concerning for preeclampsia at this time. Noted pr/cr ratio of 0.24. Has next appointment in office on Friday.     Dyspnea: improved. Normal oxygen saturation. May be dyspnea of pregnancy.     NST reactive  "

## 2018-05-09 NOTE — IP AVS SNAPSHOT
MRN:[id number]                      After Visit Summary   5/9/2018    Arpita Marcano    MRN: [id number]           Thank you!     Thank you for choosing [facility] for your care. Our goal is always to provide you with excellent care. Hearing back from our patients is one way we can continue to improve our services. Please take a few minutes to complete the written survey that you may receive in the mail after you visit. If you would like to speak to someone directly about your visit please contact Patient Relations at [phone]. Thank you!          Patient Information     Date Of Birth          1990        About your hospital stay     You were admitted on:  May 9, 2018 You last received care in the:  [facility] Labor and Delivery    You were discharged on:  May 9, 2018       Who to Call     For medical emergencies, please call 911.  For non-urgent questions about your medical care, please call your primary care provider or clinic, None          Attending Provider     Provider Specialty    Rachana Lange MD OB/Gyn       Primary Care Provider Fax #    Physician No Ref-Primary [phone]      Further instructions from your care team       Discharge Instruction for Undelivered Patients      You were seen for: Assessment for pre-eclampsia  We Consulted: Dr. Lange  You had (Test or Medicine):Monitoring and lab tests, all within normal limits     Diet:   Drink 8 to 12 glasses of liquids (milk, juice, water) every day.  You may eat meals and snacks.     Activity:  Count fetal kicks everyday (see handout)  Call your doctor or nurse midwife if your baby is moving less than usual.     Call your provider if you notice:  Swelling in your face or increased swelling in your hands or legs.  Headaches that are not relieved by Tylenol (acetaminophen).  Changes in your vision (blurring: seeing spots or stars.)  Nausea (sick to your stomach) and vomiting (throwing up).   Weight  "gain of 5 pounds or more per week.  Heartburn that doesn't go away.  Signs of bladder infection: pain when you urinate (use the toilet), need to go more often and more urgently.  The bag of lua (rupture of membranes) breaks, or you notice leaking in your underwear.  Bright red blood in your underwear.  Abdominal (lower belly) or stomach pain.  For first baby: Contractions (tightening) less than 5 minutes apart for one hour or more.  Increase or change in vaginal discharge (note the color and amount)      Follow-up:  As scheduled in the clinic          Pending Results     No orders found from 5/7/2018 to 5/10/2018.            Admission Information     Date & Time Provider Department Dept. Phone    5/9/2018 Rachana Lange MD [facility] Labor and Delivery [phone]      Your Vitals Were     Blood Pressure Temperature Height Pulse Oximetry          128/83 97.8  F (36.6  C) (Oral) 1.651 m (5' 5\") 97%        MyChart Information     [facility] gives you secure access to your electronic health record. If you see a primary care provider, you can also send messages to your care team and make appointments. If you have questions, please call your primary care clinic.  If you do not have a primary care provider, please call [phone] and they will assist you.        Care EveryWhere ID     This is your Care EveryWhere ID. This could be used by other organizations to access your [facility] medical records  [id number]        Equal Access to Services     GA VOGEL AH: Hadii elle joneso Sotiffany, waaxda luqadaha, qaybta kaalmada adeegyada, ej ramirez. So [facility] [phone].    ATENCIÓN: Si habla español, tiene a wynne disposición servicios gratuitos de asistencia lingüística. Llame al [phone].    We comply with applicable federal civil rights laws and [place] laws. We do not discriminate on the basis of race, color, national origin, age, disability, sex, sexual " orientation, or gender identity.               Review of your medicines      UNREVIEWED medicines. Ask your doctor about these medicines        Dose / Directions    PRENATAL VITAMIN PO        Refills:  0                Protect others around you: Learn how to safely use, store and throw away your medicines at www.disposemymeds.org.             Medication List: This is a list of all your medications and when to take them. Check marks below indicate your daily home schedule. Keep this list as a reference.      Medications           Morning Afternoon Evening Bedtime As Needed    PRENATAL VITAMIN PO

## 2018-05-09 NOTE — PLAN OF CARE
Data: Patient assessed in the [facility] for hypertension disorder of pregnancy.  Cervical exam not examined.  Membranes intact.  Contractions N/A.  Action:  Presumed adequate fetal oxygenation documented (see flow record). Discharge instructions reviewed.  Patient instructed to report change in fetal movement, vaginal leaking of fluid or bleeding, abdominal pain, or any concerns related to the pregnancy to her nurse/physician.    Response: Orders to discharge home per Rachana Lange.  Patient verbalized understanding of education and verbalized agreement with plan. Discharged to home at 1043.

## 2018-05-09 NOTE — DISCHARGE INSTRUCTIONS
Discharge Instruction for Undelivered Patients      You were seen for: Assessment for pre-eclampsia  We Consulted: Dr. Lange  You had (Test or Medicine):Monitoring and lab tests, all within normal limits     Diet:   Drink 8 to 12 glasses of liquids (milk, juice, water) every day.  You may eat meals and snacks.     Activity:  Count fetal kicks everyday (see handout)  Call your doctor or nurse midwife if your baby is moving less than usual.     Call your provider if you notice:  Swelling in your face or increased swelling in your hands or legs.  Headaches that are not relieved by Tylenol (acetaminophen).  Changes in your vision (blurring: seeing spots or stars.)  Nausea (sick to your stomach) and vomiting (throwing up).   Weight gain of 5 pounds or more per week.  Heartburn that doesn't go away.  Signs of bladder infection: pain when you urinate (use the toilet), need to go more often and more urgently.  The bag of lua (rupture of membranes) breaks, or you notice leaking in your underwear.  Bright red blood in your underwear.  Abdominal (lower belly) or stomach pain.  For first baby: Contractions (tightening) less than 5 minutes apart for one hour or more.  Increase or change in vaginal discharge (note the color and amount)      Follow-up:  As scheduled in the clinic

## 2018-05-09 NOTE — IP AVS SNAPSHOT
[facility] Labor and Delivery    [address]    [facility] [address]    Phone:  [phone]    Fax:  [phone]                                       After Visit Summary   5/9/2018    Arpita Marcano    MRN: [id number]           After Visit Summary Signature Page     I have received my discharge instructions, and my questions have been answered. I have discussed any challenges I see with this plan with the nurse or doctor.    ..........................................................................................................................................  Patient/Patient Representative Signature      ..........................................................................................................................................  Patient Representative Print Name and Relationship to Patient    ..................................................               ................................................  Date                                            Time    ..........................................................................................................................................  Reviewed by Signature/Title    ...................................................              ..............................................  Date                                                            Time

## 2018-05-09 NOTE — PLAN OF CARE
, 38 3/7 weeks gestation. Woke up at 0230 with trouble catching her breath and a headache, relieved some by Tylenol but still present and rates at 5. Had episode of blurry vision at 0630. Denies epigastric pain. Edema +2 in feet and ankles. O2 sats 97%. Denies bleeding or LOF. +2 reflexes. Monitors explained and applied. History and prenatal reviewed. Dr. Lange updated.  Pending lab results.

## 2018-05-21 ENCOUNTER — HOSPITAL ENCOUNTER (INPATIENT)
Facility: CLINIC | Age: 28
LOS: 3 days | Discharge: HOME | End: 2018-05-24
Attending: OBSTETRICS & GYNECOLOGY | Admitting: OBSTETRICS & GYNECOLOGY
Payer: COMMERCIAL

## 2018-05-21 ENCOUNTER — ANESTHESIA EVENT (OUTPATIENT)
Dept: OBGYN | Facility: CLINIC | Age: 28
End: 2018-05-21
Payer: COMMERCIAL

## 2018-05-21 ENCOUNTER — ANESTHESIA (OUTPATIENT)
Dept: OBGYN | Facility: CLINIC | Age: 28
End: 2018-05-21
Payer: COMMERCIAL

## 2018-05-21 PROBLEM — Z98.890 S/P DILATATION AND CURETTAGE: Status: RESOLVED | Noted: 2017-05-31 | Resolved: 2018-05-21

## 2018-05-21 PROBLEM — O99.213 OBESITY AFFECTING PREGNANCY IN THIRD TRIMESTER: Status: ACTIVE | Noted: 2018-05-21

## 2018-05-21 LAB
HGB BLD-MCNC: 10.4 G/DL (ref 11.7–15.7)
T PALLIDUM AB SER QL: NONREACTIVE

## 2018-05-21 PROCEDURE — 25000128 H RX IP 250 OP 636: Performed by: ANESTHESIOLOGY

## 2018-05-21 PROCEDURE — 25000125 ZZHC RX 250: Performed by: OBSTETRICS & GYNECOLOGY

## 2018-05-21 PROCEDURE — 86780 TREPONEMA PALLIDUM: CPT | Performed by: OBSTETRICS & GYNECOLOGY

## 2018-05-21 PROCEDURE — 37000011 ZZH ANESTHESIA WARD SERVICE

## 2018-05-21 PROCEDURE — 12000029 ZZH R&B OB INTERMEDIATE

## 2018-05-21 PROCEDURE — 40000671 ZZH STATISTIC ANESTHESIA CASE

## 2018-05-21 PROCEDURE — 10907ZC DRAINAGE OF AMNIOTIC FLUID, THERAPEUTIC FROM PRODUCTS OF CONCEPTION, VIA NATURAL OR ARTIFICIAL OPENING: ICD-10-PCS | Performed by: OBSTETRICS & GYNECOLOGY

## 2018-05-21 PROCEDURE — 86901 BLOOD TYPING SEROLOGIC RH(D): CPT | Performed by: OBSTETRICS & GYNECOLOGY

## 2018-05-21 PROCEDURE — 25000128 H RX IP 250 OP 636: Performed by: OBSTETRICS & GYNECOLOGY

## 2018-05-21 PROCEDURE — 25000125 ZZHC RX 250: Performed by: ANESTHESIOLOGY

## 2018-05-21 PROCEDURE — 27110038 ZZH RX 271: Performed by: ANESTHESIOLOGY

## 2018-05-21 PROCEDURE — 85018 HEMOGLOBIN: CPT | Performed by: OBSTETRICS & GYNECOLOGY

## 2018-05-21 PROCEDURE — 86900 BLOOD TYPING SEROLOGIC ABO: CPT | Performed by: OBSTETRICS & GYNECOLOGY

## 2018-05-21 PROCEDURE — 00HU33Z INSERTION OF INFUSION DEVICE INTO SPINAL CANAL, PERCUTANEOUS APPROACH: ICD-10-PCS | Performed by: ANESTHESIOLOGY

## 2018-05-21 PROCEDURE — 3E0R3BZ INTRODUCTION OF ANESTHETIC AGENT INTO SPINAL CANAL, PERCUTANEOUS APPROACH: ICD-10-PCS | Performed by: ANESTHESIOLOGY

## 2018-05-21 RX ORDER — LIDOCAINE HYDROCHLORIDE AND EPINEPHRINE 15; 5 MG/ML; UG/ML
3 INJECTION, SOLUTION EPIDURAL
Status: DISCONTINUED | OUTPATIENT
Start: 2018-05-21 | End: 2018-05-22

## 2018-05-21 RX ORDER — LIDOCAINE 40 MG/G
CREAM TOPICAL
Status: DISCONTINUED | OUTPATIENT
Start: 2018-05-21 | End: 2018-05-22

## 2018-05-21 RX ORDER — NALOXONE HYDROCHLORIDE 0.4 MG/ML
.1-.4 INJECTION, SOLUTION INTRAMUSCULAR; INTRAVENOUS; SUBCUTANEOUS
Status: DISCONTINUED | OUTPATIENT
Start: 2018-05-21 | End: 2018-05-22

## 2018-05-21 RX ORDER — SODIUM CHLORIDE, SODIUM LACTATE, POTASSIUM CHLORIDE, CALCIUM CHLORIDE 600; 310; 30; 20 MG/100ML; MG/100ML; MG/100ML; MG/100ML
INJECTION, SOLUTION INTRAVENOUS CONTINUOUS
Status: DISCONTINUED | OUTPATIENT
Start: 2018-05-21 | End: 2018-05-22

## 2018-05-21 RX ORDER — OXYTOCIN/0.9 % SODIUM CHLORIDE 30/500 ML
1-24 PLASTIC BAG, INJECTION (ML) INTRAVENOUS CONTINUOUS
Status: DISCONTINUED | OUTPATIENT
Start: 2018-05-21 | End: 2018-05-22

## 2018-05-21 RX ORDER — CARBOPROST TROMETHAMINE 250 UG/ML
250 INJECTION, SOLUTION INTRAMUSCULAR
Status: DISCONTINUED | OUTPATIENT
Start: 2018-05-21 | End: 2018-05-22

## 2018-05-21 RX ORDER — EPHEDRINE SULFATE 50 MG/ML
5 INJECTION, SOLUTION INTRAMUSCULAR; INTRAVENOUS; SUBCUTANEOUS
Status: DISCONTINUED | OUTPATIENT
Start: 2018-05-21 | End: 2018-05-22

## 2018-05-21 RX ORDER — NALBUPHINE HYDROCHLORIDE 10 MG/ML
10-20 INJECTION, SOLUTION INTRAMUSCULAR; INTRAVENOUS; SUBCUTANEOUS
Status: DISCONTINUED | OUTPATIENT
Start: 2018-05-21 | End: 2018-05-22

## 2018-05-21 RX ORDER — FENTANYL CITRATE 50 UG/ML
50-100 INJECTION, SOLUTION INTRAMUSCULAR; INTRAVENOUS
Status: DISCONTINUED | OUTPATIENT
Start: 2018-05-21 | End: 2018-05-22

## 2018-05-21 RX ORDER — CEFAZOLIN SODIUM 2 G/100ML
2 INJECTION, SOLUTION INTRAVENOUS ONCE
Status: COMPLETED | OUTPATIENT
Start: 2018-05-21 | End: 2018-05-21

## 2018-05-21 RX ORDER — METHYLERGONOVINE MALEATE 0.2 MG/ML
200 INJECTION INTRAVENOUS
Status: COMPLETED | OUTPATIENT
Start: 2018-05-21 | End: 2018-05-22

## 2018-05-21 RX ORDER — ONDANSETRON 2 MG/ML
4 INJECTION INTRAMUSCULAR; INTRAVENOUS EVERY 6 HOURS PRN
Status: DISCONTINUED | OUTPATIENT
Start: 2018-05-21 | End: 2018-05-22

## 2018-05-21 RX ORDER — NALBUPHINE HYDROCHLORIDE 10 MG/ML
2.5-5 INJECTION, SOLUTION INTRAMUSCULAR; INTRAVENOUS; SUBCUTANEOUS EVERY 6 HOURS PRN
Status: DISCONTINUED | OUTPATIENT
Start: 2018-05-21 | End: 2018-05-22

## 2018-05-21 RX ORDER — OXYTOCIN/0.9 % SODIUM CHLORIDE 30/500 ML
100-340 PLASTIC BAG, INJECTION (ML) INTRAVENOUS CONTINUOUS PRN
Status: COMPLETED | OUTPATIENT
Start: 2018-05-21 | End: 2018-05-22

## 2018-05-21 RX ORDER — OXYCODONE AND ACETAMINOPHEN 5; 325 MG/1; MG/1
1 TABLET ORAL
Status: DISCONTINUED | OUTPATIENT
Start: 2018-05-21 | End: 2018-05-22

## 2018-05-21 RX ORDER — CEFAZOLIN SODIUM 1 G/3ML
1 INJECTION, POWDER, FOR SOLUTION INTRAMUSCULAR; INTRAVENOUS EVERY 8 HOURS
Status: DISCONTINUED | OUTPATIENT
Start: 2018-05-21 | End: 2018-05-22

## 2018-05-21 RX ORDER — OXYTOCIN 10 [USP'U]/ML
10 INJECTION, SOLUTION INTRAMUSCULAR; INTRAVENOUS
Status: DISCONTINUED | OUTPATIENT
Start: 2018-05-21 | End: 2018-05-22

## 2018-05-21 RX ORDER — IBUPROFEN 800 MG/1
800 TABLET, FILM COATED ORAL
Status: DISCONTINUED | OUTPATIENT
Start: 2018-05-21 | End: 2018-05-22

## 2018-05-21 RX ORDER — ACETAMINOPHEN 325 MG/1
650 TABLET ORAL EVERY 4 HOURS PRN
Status: DISCONTINUED | OUTPATIENT
Start: 2018-05-21 | End: 2018-05-22

## 2018-05-21 RX ADMIN — Medication: at 15:50

## 2018-05-21 RX ADMIN — OXYTOCIN-SODIUM CHLORIDE 0.9% IV SOLN 30 UNIT/500ML 2 MILLI-UNITS/MIN: 30-0.9/5 SOLUTION at 09:10

## 2018-05-21 RX ADMIN — FENTANYL CITRATE 100 MCG: 50 INJECTION INTRAMUSCULAR; INTRAVENOUS at 13:07

## 2018-05-21 RX ADMIN — SODIUM CHLORIDE, POTASSIUM CHLORIDE, SODIUM LACTATE AND CALCIUM CHLORIDE: 600; 310; 30; 20 INJECTION, SOLUTION INTRAVENOUS at 14:27

## 2018-05-21 RX ADMIN — FENTANYL CITRATE 100 MCG: 50 INJECTION INTRAMUSCULAR; INTRAVENOUS at 14:26

## 2018-05-21 RX ADMIN — CEFAZOLIN SODIUM 1 G: 1 INJECTION, POWDER, FOR SOLUTION INTRAMUSCULAR; INTRAVENOUS at 16:39

## 2018-05-21 RX ADMIN — SODIUM CHLORIDE, POTASSIUM CHLORIDE, SODIUM LACTATE AND CALCIUM CHLORIDE: 600; 310; 30; 20 INJECTION, SOLUTION INTRAVENOUS at 09:05

## 2018-05-21 RX ADMIN — ONDANSETRON 4 MG: 2 INJECTION, SOLUTION INTRAMUSCULAR; INTRAVENOUS at 19:50

## 2018-05-21 RX ADMIN — CEFAZOLIN SODIUM 2 G: 2 INJECTION, SOLUTION INTRAVENOUS at 09:07

## 2018-05-21 RX ADMIN — SODIUM CHLORIDE, POTASSIUM CHLORIDE, SODIUM LACTATE AND CALCIUM CHLORIDE: 600; 310; 30; 20 INJECTION, SOLUTION INTRAVENOUS at 16:32

## 2018-05-21 NOTE — H&P
"[facility] OB H&P    Arpita Marcano  May 21, 2018    This is a 28 year old  at 40 1/7 who presents for elective IOL secondary to favorable cervix/ anxious for delivery. Prenatal care at [facility] complicated by obesity, migraine headaches, Factor 5 heterogenous, RNI and GBBS+.  She notes mild ctx's, +FM and denies lof, vb.    Prenatal chart reviewed and no interval changes noted in the health history of the patient.    O+ ab neg, RI, HIV neg, Hep B ag neg, treponemal ab neg, GCT 96 and GBBS+      PAST MEDICAL HISTORY:   Past Medical History:   Diagnosis Date     Factor V Leiden (H)      Migraines      Obesity        PAST SURGICAL HISTORY:   Past Surgical History:   Procedure Laterality Date     BUNIONECTOMY       DILATION AND CURETTAGE SUCTION N/A 2017    Procedure: DILATION AND CURETTAGE SUCTION;  DILATION AND CURETTAGE SUCTION ;  Surgeon: Cornell Nogueira MD;  Location: [facility] OR     HYSTEROSCOPY      endometriosis, done in [place]     LAPAROSCOPY      endometriosis, done in [place]     TONSILLECTOMY       wisdom teeth         FAMILY HISTORY: History reviewed. No pertinent family history.    SOCIAL HISTORY:   Social History   Substance Use Topics     Smoking status: Former Smoker     Smokeless tobacco: Never Used      Comment: quit when found out pregnant     Alcohol use No       Physical Exam:  /75  Temp 98.3  F (36.8  C) (Oral)  Resp 18  Ht 1.651 m (5' 5\")  Wt 106.6 kg (235 lb)  BMI 39.11 kg/m2  EFW: 8 lbs  TOCO: Irregular   SVE: 1/70/-2, soft, mid  FHT'S:120's, moderate variability, reactive and category 1    Assessment and Plan: IUP 40 1/7 Elective IOL, favorable cervix, Brennan score 7    1. Admit to L&D.  See IP orders.  Will start pitocin IOL.  AROM in a few hours.  FHT's currently category 1 and will closely monitor throughout the IOL course.  2. Heterogenous Factor 5- will need 40 mg Lovenox daily if pt needs a C/S for the usual obstetrical indications.  3. Pain- reviewed pain " management options and she desires epidural with increased pain level.  4. GBBS+ with hx of rash on PCN as child, will treat with Ancef (intermediate sensitivity to [place]).  5. RNI- will receive MMR PP.  6. Anticipate .    Angela Garvey

## 2018-05-21 NOTE — PLAN OF CARE
Data: Patient presented to [facility]: 2018  7:28 AM.  Patient admitted for induction for elective. Patient is a .  Prenatal record reviewed. Pregnancy has been uncomplicated..  Gestational Age 40w1d. VSS. Fetal movement present. Patient denies uterine contractions, leaking of vaginal fluid/rupture of membranes, vaginal bleeding. Support person is present.   Action: Verbal consent for EFM.  Admission assessment completed. Bill of rights reviewed.  Response: Patient verbalized agreement with plan. Will contact Dr Angela Garvey with update and further orders.

## 2018-05-21 NOTE — IP AVS SNAPSHOT
[facility]    [address]    [facility] [address]    Phone:  [phone]    Fax:  [phone]                                       After Visit Summary   5/21/2018    Arpita Marcano    MRN: [id number]           After Visit Summary Signature Page     I have received my discharge instructions, and my questions have been answered. I have discussed any challenges I see with this plan with the nurse or doctor.    ..........................................................................................................................................  Patient/Patient Representative Signature      ..........................................................................................................................................  Patient Representative Print Name and Relationship to Patient    ..................................................               ................................................  Date                                            Time    ..........................................................................................................................................  Reviewed by Signature/Title    ...................................................              ..............................................  Date                                                            Time

## 2018-05-21 NOTE — PROVIDER NOTIFICATION
05/21/18 0833   Provider Notification   Provider Name/Title Dr Garvey    Method of Notification At Bedside   Notification Reason Status Update    at bedside discussing POC. Orders for pitocin induction and antibiotics for GBS pos,  will perform AROM later.

## 2018-05-21 NOTE — IP AVS SNAPSHOT
MRN:[id number]                      After Visit Summary   5/21/2018    Arpita Marcano    MRN: [id number]           Thank you!     Thank you for choosing [facility] for your care. Our goal is always to provide you with excellent care. Hearing back from our patients is one way we can continue to improve our services. Please take a few minutes to complete the written survey that you may receive in the mail after you visit. If you would like to speak to someone directly about your visit please contact Patient Relations at [phone]. Thank you!          Patient Information     Date Of Birth          1990        About your hospital stay     You were admitted on:  May 21, 2018 You last received care in the:  [facility]    You were discharged on:  May 24, 2018        Reason for your hospital stay       Maternity care                  Who to Call     For medical emergencies, please call 911.  For non-urgent questions about your medical care, please call your primary care provider or clinic, None          Attending Provider     Provider Specialty    Angela Garvey DO OB/Gyn    Brennen Chamberlain MD OB/Gyn       Primary Care Provider Fax #    Physician No Ref-Primary [phone]      After Care Instructions     Activity       Review discharge instructions            Diet       Resume previous diet            Discharge Instructions - Postpartum visit       Schedule postpartum visit with your provider and return to clinic in 6 weeks.                  Your next 10 appointments already scheduled     Jun 18, 2018  2:30 PM CDT   Return Visit with Cody Davis MD   [facility] ([facility])    [address]  [address]  [facility] [address] [phone]              Pending Results     No orders found from 5/19/2018 to 5/22/2018.            Statement of Approval     Ordered          05/24/18 0931  I have  "reviewed and agree with all the recommendations and orders detailed in this document.  EFFECTIVE NOW     Approved and electronically signed by:  Aixa Mendoza DO             Admission Information     Date & Time Provider Department Dept. Phone    5/21/2018 Brennen Chamberlain MD [facility] [phone]      Your Vitals Were     Blood Pressure Temperature Respirations Height Weight Last Period    118/71 98.3  F (36.8  C) (Oral) 16 1.651 m (5' 5\") 106.6 kg (235 lb) 08/13/2017 (Exact Date)    Pulse Oximetry BMI (Body Mass Index)                97% 39.11 kg/m2          MyChart Information     I Just Shared gives you secure access to your electronic health record. If you see a primary care provider, you can also send messages to your care team and make appointments. If you have questions, please call your primary care clinic.  If you do not have a primary care provider, please call [phone] and they will assist you.        Care EveryWhere ID     This is your Care EveryWhere ID. This could be used by other organizations to access your [facility] medical records  [id number]        Equal Access to Services     GA VOGEL : Hadfelipa Brian, killian mayo, ej harris. So [facility] [phone].    ATENCIÓN: Si habla español, tiene a wynne disposición servicios gratuitos de asistencia lingüística. [facility] [phone].    We comply with applicable federal civil rights laws and [place] laws. We do not discriminate on the basis of race, color, national origin, age, disability, sex, sexual orientation, or gender identity.               Review of your medicines      START taking        Dose / Directions    acetaminophen 325 MG tablet   Commonly known as:  TYLENOL        Dose:  650 mg   Take 2 tablets (650 mg) by mouth every 4 hours as needed for mild pain or fever (greater than or equal to 38? C /100.4? F (oral) or 38.5? C/ 101.4? F " (core).)   Quantity:  100 tablet   Refills:  0       ferrous sulfate 325 (65 Fe) MG tablet   Commonly known as:  IRON        Dose:  325 mg   Take 1 tablet (325 mg) by mouth daily (with breakfast)   Quantity:  30 tablet   Refills:  1       ibuprofen 200 MG tablet   Commonly known as:  ADVIL/MOTRIN        Dose:  800 mg   Take 4 tablets (800 mg) by mouth every 6 hours as needed for other (cramping)   Refills:  0         CONTINUE these medicines which have NOT CHANGED        Dose / Directions    PRENATAL VITAMIN PO        Refills:  0            Where to get your medicines      These medications were sent to [facility]/pharmacy #[id number] - [place], [place] - [address]  [address] [facility] [address]     Phone:  [phone]     ferrous sulfate 325 (65 Fe) MG tablet         Some of these will need a paper prescription and others can be bought over the counter. Ask your nurse if you have questions.     You don't need a prescription for these medications     acetaminophen 325 MG tablet    ibuprofen 200 MG tablet                Protect others around you: Learn how to safely use, store and throw away your medicines at www.disposemymeds.org.             Medication List: This is a list of all your medications and when to take them. Check marks below indicate your daily home schedule. Keep this list as a reference.      Medications           Morning Afternoon Evening Bedtime As Needed    acetaminophen 325 MG tablet   Commonly known as:  TYLENOL   Take 2 tablets (650 mg) by mouth every 4 hours as needed for mild pain or fever (greater than or equal to 38? C /100.4? F (oral) or 38.5? C/ 101.4? F (core).)   Last time this was given:  650 mg on 5/23/2018 11:45 PM                                ferrous sulfate 325 (65 Fe) MG tablet   Commonly known as:  IRON   Take 1 tablet (325 mg) by mouth daily (with breakfast)                                ibuprofen 200 MG tablet   Commonly known as:  ADVIL/MOTRIN   Take 4 tablets  (800 mg) by mouth every 6 hours as needed for other (cramping)                                PRENATAL VITAMIN PO

## 2018-05-21 NOTE — ANESTHESIA PROCEDURE NOTES
Peripheral nerve/Neuraxial procedure note : epidural catheter  Pre-Procedure  Performed by JEMAL RO  Referred by KAITLIN  Location: OB    Procedure Times:5/21/2018 3:18 PM and 5/21/2018 3:38 PM  Pre-Anesthestic Checklist: patient identified, IV checked, risks and benefits discussed, informed consent, monitors and equipment checked, pre-op evaluation and at physician/surgeon's request    Timeout  Correct Patient: Yes   Correct Procedure: Yes   Correct Site: Yes   Correct Laterality: N/A   Correct Position: Yes   Site Marked: N/A   .   Procedure Documentation    Diagnosis:labor.    Procedure:    Epidural catheter.  Insertion Site:L3-4  (midline approach) Injection technique: LORT air   Local skin infiltrated with 2 mL of 1% lidocaine.  VERONICA at 7 cm     Patient Prep;mask, sterile gloves, povidone-iodine 7.5% surgical scrub, patient draped.  .  Needle: Touhy needle Needle Gauge: 17.    Needle Length (Inches) 3.5  # of attempts: 2 and # of redirects:  .   Catheter: 19 G . .  Catheter threaded easily  .  12 cm at skin.   .    Assessment/Narrative  Paresthesias: No.  .  .  Aspiration negative for heme or CSF  . Test dose of 3 mL lidocaine 1.5% w/ 1:200,000 epinephrine at. Test dose negative for signs of intravascular, subdural or intrathecal injection. Comments:  Bolus 8cc .25marc

## 2018-05-21 NOTE — PROGRESS NOTES
"PN OB/Gyn Progress Note:    Pt feeling more cramping.    /86  Temp 98.3  F (36.8  C) (Oral)  Resp 16  Ht 1.651 m (5' 5\")  Wt 106.6 kg (235 lb)  BMI 39.11 kg/m2  SVE 1/80/-2, AROM clear fluid  FHT's 120's, moderate variability,reactive, category 1  TOCO every 2-4 minutes, irregular    A/P IUP 40 17 Elective IOL  1.  AROM, clear fluid, continue pitocin IOL.  FHT's category 1.  2.  Ancef for GBBS+ status  3.  Anticipate     Dr. Angela Garvey  "

## 2018-05-21 NOTE — PROVIDER NOTIFICATION
05/21/18 1436   Provider Notification   Provider Name/Title Dr Garvey   Method of Notification In Department   Request Evaluate - Remote   Notification Reason Status Update   Updated re: SVE, pt tearful and breathing hard through contractions. Pitocin was 1/2'd, then IV fluid bolus, then pit d/c'd due to small amount of rest time between contractions. Pt has received 2 doses fentanyl. No new orders received.

## 2018-05-21 NOTE — PLAN OF CARE
Pt requesting epidural rebolus for perineal pressure and discomfort. Educated re: epidural may not totally eliminate pain from pressure. Patient is not feeling any cramping contraction pain in abdomen. Anesthesia paged.

## 2018-05-22 ENCOUNTER — APPOINTMENT (OUTPATIENT)
Dept: CT IMAGING | Facility: CLINIC | Age: 28
End: 2018-05-22
Attending: OBSTETRICS & GYNECOLOGY
Payer: COMMERCIAL

## 2018-05-22 PROBLEM — O36.60X0 FETAL MACROSOMIA, DELIVERED, CURRENT HOSPITALIZATION: Status: ACTIVE | Noted: 2018-05-22

## 2018-05-22 PROBLEM — O99.213 OBESITY AFFECTING PREGNANCY IN THIRD TRIMESTER: Status: RESOLVED | Noted: 2018-05-21 | Resolved: 2018-05-22

## 2018-05-22 LAB
ABO + RH BLD: NORMAL
ALBUMIN UR-MCNC: 100 MG/DL
AMORPH CRY #/AREA URNS HPF: ABNORMAL /HPF
APPEARANCE UR: ABNORMAL
BACTERIA #/AREA URNS HPF: ABNORMAL /HPF
BILIRUB UR QL STRIP: NEGATIVE
BLD GP AB SCN SERPL QL: NORMAL
BLOOD BANK CMNT PATIENT-IMP: NORMAL
BLOOD BANK CMNT PATIENT-IMP: NORMAL
COLOR UR AUTO: ABNORMAL
GLUCOSE UR STRIP-MCNC: NEGATIVE MG/DL
HGB BLD-MCNC: 9.2 G/DL (ref 11.7–15.7)
HGB UR QL STRIP: ABNORMAL
KETONES UR STRIP-MCNC: 20 MG/DL
LEUKOCYTE ESTERASE UR QL STRIP: ABNORMAL
NITRATE UR QL: NEGATIVE
PH UR STRIP: 7 PH (ref 5–7)
RBC #/AREA URNS AUTO: >182 /HPF (ref 0–2)
SOURCE: ABNORMAL
SP GR UR STRIP: 1.01 (ref 1–1.03)
SPECIMEN EXP DATE BLD: NORMAL
SPECIMEN EXP DATE BLD: NORMAL
TRANS CELLS #/AREA URNS HPF: 2 /HPF (ref 0–1)
UROBILINOGEN UR STRIP-MCNC: 0 MG/DL (ref 0–2)
WBC #/AREA URNS AUTO: 11 /HPF (ref 0–5)

## 2018-05-22 PROCEDURE — 86900 BLOOD TYPING SEROLOGIC ABO: CPT | Performed by: OBSTETRICS & GYNECOLOGY

## 2018-05-22 PROCEDURE — 36415 COLL VENOUS BLD VENIPUNCTURE: CPT | Performed by: OBSTETRICS & GYNECOLOGY

## 2018-05-22 PROCEDURE — 25000132 ZZH RX MED GY IP 250 OP 250 PS 637

## 2018-05-22 PROCEDURE — 0UQGXZZ REPAIR VAGINA, EXTERNAL APPROACH: ICD-10-PCS | Performed by: OBSTETRICS & GYNECOLOGY

## 2018-05-22 PROCEDURE — 86901 BLOOD TYPING SEROLOGIC RH(D): CPT | Performed by: OBSTETRICS & GYNECOLOGY

## 2018-05-22 PROCEDURE — 12000029 ZZH R&B OB INTERMEDIATE

## 2018-05-22 PROCEDURE — 25000125 ZZHC RX 250: Performed by: OBSTETRICS & GYNECOLOGY

## 2018-05-22 PROCEDURE — 72200001 ZZH LABOR CARE VAGINAL DELIVERY SINGLE

## 2018-05-22 PROCEDURE — 85018 HEMOGLOBIN: CPT | Performed by: OBSTETRICS & GYNECOLOGY

## 2018-05-22 PROCEDURE — 81001 URINALYSIS AUTO W/SCOPE: CPT | Performed by: OBSTETRICS & GYNECOLOGY

## 2018-05-22 PROCEDURE — 86850 RBC ANTIBODY SCREEN: CPT | Performed by: OBSTETRICS & GYNECOLOGY

## 2018-05-22 PROCEDURE — 40000083 ZZH STATISTIC IP LACTATION SERVICES 1-15 MIN

## 2018-05-22 PROCEDURE — 25000132 ZZH RX MED GY IP 250 OP 250 PS 637: Performed by: OBSTETRICS & GYNECOLOGY

## 2018-05-22 PROCEDURE — 25000128 H RX IP 250 OP 636: Performed by: OBSTETRICS & GYNECOLOGY

## 2018-05-22 PROCEDURE — 74178 CT ABD&PLV WO CNTR FLWD CNTR: CPT

## 2018-05-22 PROCEDURE — 99221 1ST HOSP IP/OBS SF/LOW 40: CPT | Performed by: PHYSICIAN ASSISTANT

## 2018-05-22 PROCEDURE — 87086 URINE CULTURE/COLONY COUNT: CPT | Performed by: OBSTETRICS & GYNECOLOGY

## 2018-05-22 RX ORDER — HYDROMORPHONE HYDROCHLORIDE 1 MG/ML
.3-.5 INJECTION, SOLUTION INTRAMUSCULAR; INTRAVENOUS; SUBCUTANEOUS
Status: DISCONTINUED | OUTPATIENT
Start: 2018-05-22 | End: 2018-05-24 | Stop reason: HOSPADM

## 2018-05-22 RX ORDER — OXYTOCIN 10 [USP'U]/ML
10 INJECTION, SOLUTION INTRAMUSCULAR; INTRAVENOUS
Status: DISCONTINUED | OUTPATIENT
Start: 2018-05-22 | End: 2018-05-24 | Stop reason: HOSPADM

## 2018-05-22 RX ORDER — OXYTOCIN/0.9 % SODIUM CHLORIDE 30/500 ML
100 PLASTIC BAG, INJECTION (ML) INTRAVENOUS CONTINUOUS
Status: DISCONTINUED | OUTPATIENT
Start: 2018-05-22 | End: 2018-05-24 | Stop reason: HOSPADM

## 2018-05-22 RX ORDER — MISOPROSTOL 200 UG/1
800 TABLET ORAL
Status: COMPLETED | OUTPATIENT
Start: 2018-05-22 | End: 2018-05-22

## 2018-05-22 RX ORDER — IOPAMIDOL 755 MG/ML
500 INJECTION, SOLUTION INTRAVASCULAR ONCE
Status: COMPLETED | OUTPATIENT
Start: 2018-05-22 | End: 2018-05-22

## 2018-05-22 RX ORDER — LANOLIN 100 %
OINTMENT (GRAM) TOPICAL
Status: DISCONTINUED | OUTPATIENT
Start: 2018-05-22 | End: 2018-05-24 | Stop reason: HOSPADM

## 2018-05-22 RX ORDER — FENTANYL CITRATE 50 UG/ML
50-100 INJECTION, SOLUTION INTRAMUSCULAR; INTRAVENOUS
Status: DISCONTINUED | OUTPATIENT
Start: 2018-05-22 | End: 2018-05-22

## 2018-05-22 RX ORDER — OXYCODONE HYDROCHLORIDE 5 MG/1
5 TABLET ORAL EVERY 4 HOURS PRN
Status: DISCONTINUED | OUTPATIENT
Start: 2018-05-22 | End: 2018-05-24 | Stop reason: HOSPADM

## 2018-05-22 RX ORDER — BISACODYL 10 MG
10 SUPPOSITORY, RECTAL RECTAL DAILY PRN
Status: DISCONTINUED | OUTPATIENT
Start: 2018-05-24 | End: 2018-05-24 | Stop reason: HOSPADM

## 2018-05-22 RX ORDER — OXYTOCIN/0.9 % SODIUM CHLORIDE 30/500 ML
340 PLASTIC BAG, INJECTION (ML) INTRAVENOUS CONTINUOUS PRN
Status: DISCONTINUED | OUTPATIENT
Start: 2018-05-22 | End: 2018-05-24 | Stop reason: HOSPADM

## 2018-05-22 RX ORDER — IBUPROFEN 800 MG/1
800 TABLET, FILM COATED ORAL EVERY 6 HOURS PRN
Status: DISCONTINUED | OUTPATIENT
Start: 2018-05-22 | End: 2018-05-24 | Stop reason: HOSPADM

## 2018-05-22 RX ORDER — MISOPROSTOL 200 UG/1
400 TABLET ORAL
Status: DISCONTINUED | OUTPATIENT
Start: 2018-05-22 | End: 2018-05-24 | Stop reason: HOSPADM

## 2018-05-22 RX ORDER — AMOXICILLIN 250 MG
2 CAPSULE ORAL 2 TIMES DAILY
Status: DISCONTINUED | OUTPATIENT
Start: 2018-05-22 | End: 2018-05-24 | Stop reason: HOSPADM

## 2018-05-22 RX ORDER — SODIUM CHLORIDE, SODIUM LACTATE, POTASSIUM CHLORIDE, CALCIUM CHLORIDE 600; 310; 30; 20 MG/100ML; MG/100ML; MG/100ML; MG/100ML
INJECTION, SOLUTION INTRAVENOUS CONTINUOUS
Status: DISCONTINUED | OUTPATIENT
Start: 2018-05-22 | End: 2018-05-24 | Stop reason: HOSPADM

## 2018-05-22 RX ORDER — ACETAMINOPHEN 325 MG/1
650 TABLET ORAL EVERY 4 HOURS PRN
Status: DISCONTINUED | OUTPATIENT
Start: 2018-05-22 | End: 2018-05-24 | Stop reason: HOSPADM

## 2018-05-22 RX ORDER — AMOXICILLIN 250 MG
1 CAPSULE ORAL 2 TIMES DAILY
Status: DISCONTINUED | OUTPATIENT
Start: 2018-05-22 | End: 2018-05-24 | Stop reason: HOSPADM

## 2018-05-22 RX ORDER — HYDROCORTISONE 2.5 %
CREAM (GRAM) TOPICAL 3 TIMES DAILY PRN
Status: DISCONTINUED | OUTPATIENT
Start: 2018-05-22 | End: 2018-05-24 | Stop reason: HOSPADM

## 2018-05-22 RX ORDER — NALOXONE HYDROCHLORIDE 0.4 MG/ML
.1-.4 INJECTION, SOLUTION INTRAMUSCULAR; INTRAVENOUS; SUBCUTANEOUS
Status: DISCONTINUED | OUTPATIENT
Start: 2018-05-22 | End: 2018-05-24 | Stop reason: HOSPADM

## 2018-05-22 RX ORDER — MISOPROSTOL 200 UG/1
TABLET ORAL
Status: COMPLETED
Start: 2018-05-22 | End: 2018-05-22

## 2018-05-22 RX ADMIN — SODIUM CHLORIDE, POTASSIUM CHLORIDE, SODIUM LACTATE AND CALCIUM CHLORIDE: 600; 310; 30; 20 INJECTION, SOLUTION INTRAVENOUS at 08:24

## 2018-05-22 RX ADMIN — ACETAMINOPHEN 650 MG: 325 TABLET, FILM COATED ORAL at 21:02

## 2018-05-22 RX ADMIN — METHYLERGONOVINE MALEATE 200 MCG: 0.2 INJECTION INTRAVENOUS at 00:28

## 2018-05-22 RX ADMIN — FENTANYL CITRATE 50 MCG: 50 INJECTION, SOLUTION INTRAMUSCULAR; INTRAVENOUS at 01:35

## 2018-05-22 RX ADMIN — MISOPROSTOL 800 MCG: 200 TABLET ORAL at 00:29

## 2018-05-22 RX ADMIN — ACETAMINOPHEN 650 MG: 325 TABLET, FILM COATED ORAL at 16:41

## 2018-05-22 RX ADMIN — FENTANYL CITRATE 50 MCG: 50 INJECTION, SOLUTION INTRAMUSCULAR; INTRAVENOUS at 01:10

## 2018-05-22 RX ADMIN — OXYTOCIN-SODIUM CHLORIDE 0.9% IV SOLN 30 UNIT/500ML 100 ML/HR: 30-0.9/5 SOLUTION at 02:00

## 2018-05-22 RX ADMIN — OXYTOCIN-SODIUM CHLORIDE 0.9% IV SOLN 30 UNIT/500ML 340 ML/HR: 30-0.9/5 SOLUTION at 00:35

## 2018-05-22 RX ADMIN — LIDOCAINE HYDROCHLORIDE 10 ML: 10 INJECTION, SOLUTION INFILTRATION; PERINEURAL at 00:10

## 2018-05-22 RX ADMIN — HYDROMORPHONE HYDROCHLORIDE 0.3 MG: 1 INJECTION, SOLUTION INTRAMUSCULAR; INTRAVENOUS; SUBCUTANEOUS at 03:52

## 2018-05-22 RX ADMIN — SENNOSIDES AND DOCUSATE SODIUM 1 TABLET: 8.6; 5 TABLET ORAL at 21:02

## 2018-05-22 RX ADMIN — IOPAMIDOL 100 ML: 755 INJECTION, SOLUTION INTRAVENOUS at 04:53

## 2018-05-22 RX ADMIN — ACETAMINOPHEN 650 MG: 325 TABLET, FILM COATED ORAL at 10:56

## 2018-05-22 RX ADMIN — SODIUM CHLORIDE 65 ML: 9 INJECTION, SOLUTION INTRAVENOUS at 04:53

## 2018-05-22 RX ADMIN — OXYCODONE HYDROCHLORIDE 5 MG: 5 TABLET ORAL at 03:08

## 2018-05-22 RX ADMIN — HYDROMORPHONE HYDROCHLORIDE 0.5 MG: 1 INJECTION, SOLUTION INTRAMUSCULAR; INTRAVENOUS; SUBCUTANEOUS at 05:20

## 2018-05-22 NOTE — PLAN OF CARE
1915 - bedside report received, care assumed.  Discussed POC with pt and spouse and they verbalized understanding at this time.  Will continue to monitor.    1940 - at bedside, pt agreeable to ames catheter placement since pt is more numb at this time.  Ames catheter placed without complication, SVE and pt repositioned.  Pt denies pain at this time.  Will continue to monitor.    2030 - to pt bedside, pt again c/o catheter discomfort.  Requesting removal of ames catheter and straight cath PRN instead.  Pt reports feeling UC's at this time, requesting epidural redose.  Ames catheter removed without complication per pt request, Dr. Levy paged and notified of pt request for redose.  Pt repositioned to R side.

## 2018-05-22 NOTE — PLAN OF CARE
This RN called radiology to verify STAT CT order, they verified they see the order and will call when ready for pt shortly.

## 2018-05-22 NOTE — PROVIDER NOTIFICATION
05/22/18 0605   Provider Notification   Provider Name/Title Dr. Chamberlain   Method of Notification Phone   Dr. Chamberlain called, stated it was not necessary to update MD with results of CT as he will view them himself when available.  Primary RN updated.

## 2018-05-22 NOTE — ADDENDUM NOTE
Addendum  created 05/21/18 2051 by Ezio Levy MD    Anesthesia Event edited, Procedure Event Log accessed

## 2018-05-22 NOTE — ANESTHESIA POSTPROCEDURE EVALUATION
Patient: Arpita Marcano      S/P epidural for labor.   I or my partner was immediately available for management of this patient during epidural analgesia infusion.  VSS.  Doing well. Block resolved.  Neuro at baseline. Denies positional headache. Minimal side effects easily managed w/ PRN meds. No apparent anesthetic complications. No follow-up required.    Josse Saldaña MD    Note:  Anesthesia Post Evaluation    Last vitals:  Vitals:    05/22/18 0352 05/22/18 0520 05/22/18 0602   BP:      Resp: 20 18    Temp:   98  F (36.7  C)   SpO2:            Electronically Signed By: Josse Saldaña MD  May 22, 2018  10:24 AM

## 2018-05-22 NOTE — L&D DELIVERY NOTE
DELIVERY SUMMARY    Arpita Marcano MRN# [id number]   Age: 28 year old YOB: 1990     ASSESSMENT & PLAN:   1)  S/P Vacuum-assisted VD  2)  Fetal macrosomia  3)  PP Hemorrhage after placenta delivered due to uterine atony - responded to bimanual massage, IV Pitocin, Methergine IM, and rectal Cytotec.  Will keep a Anderson in place until AM, and check Hgb in AM.         Labor Event Times    Labor onset date:  18 Onset time:   1:00 PM   Dilation complete date:  18 Complete time:  11:08 PM   Start pushing date/time:  2018 2311            Labor Length    1st Stage (hrs):  10 (min):  8   2nd Stage (hrs):  1 (min):  9   3rd Stage (hrs):  0 (min):  5      Labor Events     labor?:  No    steroids:  None   Labor Type:  Induction   Predominate monitoring during 1st stage:  continuous electronic fetal monitoring      Antibiotics received during labor?:  Yes   Reason for Antibiotics:  GBS   Antibiotics received for GBS:  Cefazolin   Antibiotics Given (GBS):  Greater than 4 hours prior to delivery      Rupture date/time: 18 1218   Rupture type:  Artificial Rupture of Membranes   Fluid color:  Clear   Fluid odor:  Normal      Induction:  Oxytocin, AROM   Induction date/time:     Cervical ripening date/time:     Indications for induction:  Elective      Delivery/Placenta Date and Time    Delivery Date:  18 Delivery Time:  12:17 AM   Placenta Date/Time:  2018 12:22 AM   Oxytocin given at the time of delivery:  after delivery of baby      Vaginal Counts    Initial count performed by 2 team members:   Two Team Members   Dr. Bulmaro Dean, RN          Needles Suture [place] Sponges Instruments   Initial counts 2  5    Added to count  1 5    Final counts 2 1 10       Placed during labor Accounted for at the end of labor   No NA   No NA   No NA      Final count performed by 2 team members:   Two Team Members   Dr Bulmaro CHAVEZ RN.         Final count correct?:  Yes        "  Apgars    Living status:  Living    1 Minute 5 Minute 10 Minute 15 Minute 20 Minute   Skin color: 0  1       Heart rate: 2  2       Reflex irritability: 2  2       Muscle tone: 2  2       Respiratory effort: 2  2       Total: 8  9          Apgars assigned by:  ALECIA Wan    Vessels:  3 Vessels Complications:  None   Cord Blood Disposition:  Lab Gases Sent?:  No         [place] Resuscitation    Methods:  None      Output in Delivery Room:  Stool       Measurements    Weight:  9 lb 3.8 oz Length:  1' 8.5\"   Head circumference:  34.9 cm       Skin to Skin and Feeding Plan    Skin to skin initiation date/time: 18 0017   Skin to skin with:  Mother   Skin to skin end date/time:        Labor Events and Shoulder Dystocia    Fetal Tracing Prior to Delivery:  Category 2   Fetal Tracing Comments:  Mild variable decelerations in second stage   Shoulder dystocia present?:  Neg            Delivery (Maternal) (Provider to Complete) ([id number])    Episiotomy:  None   Perineal lacerations:  None    Vaginal laceration?:  Yes Repaired?:  Yes   Cervical laceration?:  No          Mother's Information  Mother: Arpita Marcano #[id number]    Start of Mother's Information     IO Blood Loss  18 1300 - 18 0107    Mom's I/O Activity            End of Mother's Information  Mother: Arpita Marcano #[id number]            Delivery - Provider to Complete ([id number])    Delivering clinician:  KELVIN REID   Attempted Delivery Types (Choose all that apply):  Spontaneous Vaginal Delivery, Vacuum (Extractor)   Delivery Type (Choose the 1 that will go to the Birth History):  Vaginal, Vacuum (Extractor)   Verbal Informed Consent Obtained For Vacuum:  Yes Alternate Labor Strategies Discussed (vacuum):  Yes      Emergency Resources Available (vacuum):  Charge Nurse/Team, Resuscitation Team   Type (vacuum):  [place] Accrued Pulling Time (vacuum):  3 min 40 sec      # of Pop-Offs (vacuum):  0 # of Pulls/Contractions (vacuum):  5 "   Position (vacuum):  OA Fetal Station (vacuum):  +4      Indication for Operative Vaginal Delivery (vacuum):  Maternal Exhaustion   Operative Vaginal Delivery Brief Note Vacuum:  Prior to attempting vacuum-assisted vaginal delivery, I discussed with patient the indications, risks, benefits, and alternatives of this/these option(s) and the patient understands and has given informed consent.  The bladder was drained via straight catheterization.  After confirmation of fetal head position, a Outlet application of the above noted device was performed without difficulty.  With gentle traction and patient pushing, delivery was accomplished without difficulty.  No shoulder dystocia was encountered, and delivery was atraumatic.                    Placenta    Delayed Cord Clamping:  Done   Date/Time:  5/22/2018 12:22 AM   Removal:  Spontaneous   Comments:  Moderate postpartum hemorrhage due to prolonged uterine atony, uterine tone improved with bimanual massage, Pitocin IV, Methergine IM, and rectal Cytotec.  Check Hgb in AM.   Disposition:  Hospital disposal      Anesthesia    Method:  INTRAVENOUS REGIONAL, Epidural, Local   Cervical dilation at placement:  0-3   Analgesic:   BIRTH HISTORY: ANALGESIC   FENTANYL (OPIOID AGONISTS)   BUPIVACAINE 0.125%  ML NS EPIDURAL DRIP   LIDOCAINE 1 % INJECTION            Presentation and Position    Presentation:  Vertex   Position:  Right Occiput Anterior                    Brennen Chamberlain MD

## 2018-05-22 NOTE — PLAN OF CARE
Problem: Patient Care Overview  Goal: Plan of Care/Patient Progress Review  Outcome: No Change  See flow sheet for VS and previous RN notes. Pt continues to report lower back pain, especially on right. Received dilaudid x2 since transfer to mom/baby-relief obtained. Urine had been bright, cherry red. Now clearing and tea/quinton colored. Urine specimen was sent per MD orders and pt had CT completed at ordered-awaiting results. MD will view in AM per MD-no need to call per Dr. Chamberlain. Pt is NPO at this time. Monitor.

## 2018-05-22 NOTE — PROGRESS NOTES
"[facility] Note    S:  Patient had severe bilateral upped back pain, worse on the right, all night. This was also associated with gross hematuria noted in her Anderson that was placed to keep bladder empty due to prior uterine atony.  Pain is somewhat better this AM, however has required IV Dilaudid to control pain through the night.  Minimal lochia.    O:  Blood pressure 131/72, temperature 98  F (36.7  C), temperature source Oral, resp. rate 18, height 1.651 m (5' 5\"), weight 106.6 kg (235 lb), last menstrual period 08/13/2017, SpO2 97 %, unknown if currently breastfeeding.        Urine output adequate - urine clearing in Anderson        Abdomen - Fundus firm, at umbilicus, nontender        Back - mild Right CVAT, no Left CVAT        Extremities - No calf tenderness    Hemoglobin   Date Value Ref Range Status   05/22/2018 9.2 (L) 11.7 - 15.7 g/dL Final   ]    CT Abd/Pelvis- Marked right hydronephrosis and a delayed nephrogram on the right.  The mid and distal ureter appear thick-walled. The cause of obstruction is not seen. No ureteral stone. No evidence of urine leak.  The uterus has a normal postpartum endometrial stripe thickness.    A:   Postpartum Day# 0, s/p Vacuum-assisted Vaginal delivery        Back pain, R>L - suspect due to right ureteral obstruction and hydronephrosis    P:  1)  Urology consult        2)  Clear liqs for now pending consult in case stent is needed.        3)  Anderson out this AM        4)  Ur C&S pending        5)  Check Hgb in AM again    Brennen Chamberlain MD          "

## 2018-05-22 NOTE — PLAN OF CARE
Problem: Patient Care Overview  Goal: Plan of Care/Patient Progress Review  Outcome: Improving  Stable patient meeting expected goals. Pain being managed with tylenol. Up and showered. IV removed. Using tucks for comfort. Breastfeeding infant. Independent with self and  cares.

## 2018-05-22 NOTE — PROVIDER NOTIFICATION
05/21/18 2608   Provider Notification   Provider Name/Title Dr. Chamberlain   Method of Notification Phone   Request Evaluate in Person   Dr. Chamberlain updated on pt status, pt feeling increased pressure and is occasionally involuntarily pushing.  Discussed SVE of 9/100/0 compared to SVE of 7/90/-2 approx. 1 hour ago.  Discussed recent epidural redose with no improvement in pain.  Dr. Chamberlain verbalized understanding.  States he will head in to hospital.

## 2018-05-22 NOTE — PLAN OF CARE
Data: Arpita Marcano transferred to 431 via wheelchair at 0330. Baby transferred via parent's arms.  Action: Receiving unit notified of transfer: Yes. Patient and family notified of room change. Report given to MARGARITA Singer at 0400. Belongings sent to receiving unit. Accompanied by Registered Nurse. Oriented patient to surroundings. Call light within reach. ID bands double-checked with receiving RN.  Response: Patient tolerated transfer and is stable.

## 2018-05-22 NOTE — PROVIDER NOTIFICATION
05/22/18 0546   Provider Notification   Provider Name/Title Dr. Chamberlain   Method of Notification Phone   Notification Reason Status Update   Dr. Chamberlain called for updated on pt, notified him that pt returned from CT approx. 25 min ago, still waiting for report.  Reviewed UA results and informed MD urine beginning to clear and is now dark yellow in color.  Discussed pt rating pain at 7/10 after moving around, x2 doses of dilaudid given total over the last two hours.  Dr. Chamberlain verbalized understanding, no new orders at this time.  Will continue to monitor and update primary RN.

## 2018-05-22 NOTE — PROVIDER NOTIFICATION
05/22/18 0335   Provider Notification   Provider Name/Title Dr. Chamberlain   Method of Notification Phone   Request Evaluate-Remote   Notification Reason Medication Request;Status Update   Dr. Chamberlain updated on pt report of increasing lower back pain despite oxy IR provided at 0308.  Informed Dr. Chamberlain that pt states pain feels like it is kidney pain.  Urine continues to be bright cherry red.  Fundus firm and down one with scant to small flow.  Last temp 100.  Dr. Chamberlain verbalized understanding.  TORB for CT abdomen and pelvis without contrast to check for kidney stones, 0.3-0.5mg IV dilaudid q1h PRN for pain, and collect urine to send for culture.  Dr. Chamberlain called back at 0346 to change CT order to CT abdomen and pelvis with and without contrast to also check for a hole in the bladder.  MARGARITA Singer updated.

## 2018-05-22 NOTE — LACTATION NOTE
LC to see patient.  Her baby was nursing at the time of the visit with a nutritive suck pattern noted.  Her baby was positioned turned out on her back, but no pain noted and infant was actively sucking so LC did not change position.  Positioning with infant turned toward breast was reviewed to increase infant and maternal comfort for next latch.  Patient is also aware that she should monitor for round nipples post nursing.  LC is available to assist with latch.  No other concerns noted.

## 2018-05-22 NOTE — PLAN OF CARE
"Pt returned from CT.  Pt requesting IV pain medication for pain 7/10 after moving around.  IV fluids reconnected at this time as well.  Pt denies further needs/concerns at this time.  Urine beginning to clear in the tubing of the ames catheter, now a dark yellow in color.  Used \"Medications & Mothers' Milk\" as a reference to verify if pt able to continue breastfeeding after contrast, per reference, okay for pt to continue breastfeeding.  MARGARITA Singer notified and updated.  "

## 2018-05-22 NOTE — PROGRESS NOTES
Report received and care assumed. Pt oriented to room, plan of care, infant safety and /postpartum teaching initiated. Call light in reach.

## 2018-05-22 NOTE — CONSULTS
[facility] Consultation by [facility]    Arpita Marcano MRN# [id number]   Age: 28 year old YOB: 1990     Date of Admission:  2018    Reason for consult: Right flank pain       Requesting physician: Dr. Chamberlain       Level of consult: Consult, follow and place orders           Assessment and Plan:   Assessment:   Right flank pain, post partum, right hydronephrosis, gross hematuria      Plan:   Pain has resolved this AM. No plan for urgent urologic intervention at this moment. Can eat from our standpoint. Awaiting first void since Anderson removed.     If no recurrence of pain prior to discharge, will plan to see her as an outpatient for repeat imaging in 2 weeks to follow hydronephrosis. May be 2/2 pregnant state vs. Recently passed stone.     Imaging reviewed by Dr. Farmer.  We will follow.     Bess Sams PA-C  [facility]  Cell: [phone] (text or call, Mon-Wed & Fri until 5pm)               Chief Complaint:   Post partum, vaginal delivery, right flank pain     History is obtained from the patient and EMR.         History of Present Illness:   This patient is a 28 year old female is post partum day one after vac-assisted  to baby girl. Had severe right flank pain last night and gross hematuria. Did have one episode of flank pain at 15 wks (renal u/s w/o hydro). CT performed to eval pain and to eval for bladder leak. CT revealed moderate right hydro and wall thickening of the mid and distal ureter. Delayed nephrogram also noted on the right and enlarged post-partum uterus.    Feeling much better this AM. Anderson removed at ~7am. Has not voided yet. No fevers or chills.               Past Medical History:     Past Medical History:   Diagnosis Date     Factor V Leiden (H)      Fetal macrosomia, delivered, current hospitalization 2018     Migraines      Obesity      Obesity affecting pregnancy in third trimester 2018     Obesity during pregnancy, delivered 2018      Postpartum hemorrhage, delivered, current hospitalization 5/22/2018     Vacuum extraction, delivered, current hospitalization 5/22/2018             Past Surgical History:     Past Surgical History:   Procedure Laterality Date     BUNIONECTOMY       DILATION AND CURETTAGE SUCTION N/A 5/30/2017    Procedure: DILATION AND CURETTAGE SUCTION;  DILATION AND CURETTAGE SUCTION ;  Surgeon: Cornell Nogueira MD;  Location: [facility] OR     HYSTEROSCOPY      endometriosis, done in [place]     LAPAROSCOPY      endometriosis, done in [place]     TONSILLECTOMY       wisdom teeth               Social History:     Social History   Substance Use Topics     Smoking status: Former Smoker     Smokeless tobacco: Never Used      Comment: quit when found out pregnant     Alcohol use No             Family History:   History reviewed. No pertinent family history.  Family history reviewed.             Allergies:     Allergies   Allergen Reactions     Penicillins Rash     As a child             Medications:     Current Facility-Administered Medications   Medication     acetaminophen (TYLENOL) tablet 650 mg     [START ON 5/24/2018] bisacodyl (DULCOLAX) Suppository 10 mg     hydrocortisone 2.5 % cream     HYDROmorphone (PF) (DILAUDID) injection 0.3-0.5 mg     ibuprofen (ADVIL/MOTRIN) tablet 800 mg     lactated ringers BOLUS 1,000 mL     lactated ringers infusion     lanolin ointment     [START ON 5/23/2018] measles, mumps and rubella vaccine (MMR) injection 0.5 mL     misoprostol (CYTOTEC) tablet 400 mcg     naloxone (NARCAN) injection 0.1-0.4 mg     NO Rho (D) immune globulin (RhoGam) needed - mother Rh POSITIVE     No Tdap Needed - Assessment: Patient does not need Tdap vaccine     oxyCODONE IR (ROXICODONE) tablet 5 mg     oxytocin (PITOCIN) 30 units in 500 mL 0.9% NaCl infusion     oxytocin (PITOCIN) 30 units in 500 mL 0.9% NaCl infusion     oxytocin (PITOCIN) injection 10 Units     senna-docusate (SENOKOT-S;PERICOLACE) 8.6-50 MG per tablet 1 tablet     "Or     senna-docusate (SENOKOT-S;PERICOLACE) 8.6-50 MG per tablet 2 tablet     [START ON 5/24/2018] sodium phosphate (FLEET ENEMA) 1 enema             Review of Systems:   A comprehensive review of systems was performed and found to be negative except as described in this note     /72  Temp 98  F (36.7  C) (Oral)  Resp 18  Ht 1.651 m (5' 5\")  Wt 106.6 kg (235 lb)  LMP 08/13/2017 (Exact Date)  SpO2 97%  Breastfeeding? Unknown  BMI 39.11 kg/m2  GEN: NAD, lying in bed, nursing  HEENT: EOMI  NECK: Supple  ABD: soft  EXT: Trace B LE edema              Data:     Lab Results   Component Value Date    WBC 11.1 (H) 05/09/2018    HGB 9.2 (L) 05/22/2018    HCT 33.2 (L) 05/09/2018    MCV 82 05/09/2018     05/09/2018     Lab Results   Component Value Date    CR 0.69 05/09/2018       CT ABDOMEN PELVIS W/O & W CONTRAST  5/22/2018 5:08 AM      HISTORY: Hematuria, back pain, rule out stones, rule out urine leak.     TECHNIQUE: CT abdomen and pelvis with 100 mL Isovue-370. Pre and  postcontrast CT of the abdomen and pelvis was performed using a  biphasic contrast injection technique. Radiation dose for this scan  was reduced using automated exposure control, adjustment of the mA  and/or kV according to patient size, or iterative reconstruction  technique.     COMPARISON: None.     FINDINGS:  Abdomen: There is marked dilatation of the right renal collecting  system and ureter. No cause of obstruction is evident. There is wall  thickening of the mid and distal ureter. Delayed nephrogram on the  right. The left kidney is normal in appearance without hydronephrosis.     The lung bases are unremarkable. The heart size is normal. The liver,  spleen, gallbladder, pancreas and adrenal glands are normal in  appearance. There is no abdominal or pelvic lymph node enlargement.     Pelvis: The uterus is enlarged consistent with the postpartum state.  There is a catheter in the urinary bladder. No evidence of urinary  bladder " leak on delayed imaging. There is trace free fluid in the  pelvis. No bowel obstruction or inflammation. No free intraperitoneal  gas.         IMPRESSION:  1. Marked right hydronephrosis and a delayed nephrogram on the right.  The mid and distal ureter appear thick-walled. The cause of  obstruction is not seen. No ureteral stone. No evidence of urine leak.  2. Enlarged postpartum uterus. The endometrium measures up to 1.6 cm  in thickness which may be retained products of conception.

## 2018-05-22 NOTE — PROVIDER NOTIFICATION
05/22/18 0245   Provider Notification   Provider Name/Title Dr. Chamberlain   Method of Notification Phone   Request Evaluate-Remote   Dr. Chamberlain updated on pt's fundus firm and down one, scant to small flow, ames placed and urine was initially pink and clearing to yellow urine, and over the last 15 minutes urine is now casanova red.  Dr. Chamberlain verbalized understanding, ordered to continue running oxytocin at 100mL/hr, and when bag completed, run LR at 100mL/hr and continue to monitor.

## 2018-05-22 NOTE — PROVIDER NOTIFICATION
05/22/18 0713   Provider Notification   Provider Name/Title Dr. Chamberlain   Method of Notification At Bedside   Dr. Chamberlain at bedside discussing CT results with pt.  Discussing POC with pt.  Viewed yellow urine in catheter tubing.  VORB to d/c ames catheter, clear liquid diet, urology consult.  Will update primary day RN.

## 2018-05-22 NOTE — PROGRESS NOTES
Patient doing well this shift. Voiding without difficulty. Using tylenol for pain. Urology consult today, patient may eat, no surgery to be done. Patient states feeling a lot better and pain is gone. IV saline locked. Plan for patient to take shower this evening.

## 2018-05-22 NOTE — ADDENDUM NOTE
Addendum  created 05/21/18 1913 by Ezio Levy MD    Anesthesia Event edited, Procedure Event Log accessed

## 2018-05-23 ENCOUNTER — APPOINTMENT (OUTPATIENT)
Dept: ULTRASOUND IMAGING | Facility: CLINIC | Age: 28
End: 2018-05-23
Attending: OBSTETRICS & GYNECOLOGY
Payer: COMMERCIAL

## 2018-05-23 VITALS
RESPIRATION RATE: 16 BRPM | SYSTOLIC BLOOD PRESSURE: 118 MMHG | OXYGEN SATURATION: 97 % | TEMPERATURE: 98.3 F | HEIGHT: 65 IN | DIASTOLIC BLOOD PRESSURE: 71 MMHG | BODY MASS INDEX: 39.15 KG/M2 | WEIGHT: 235 LBS

## 2018-05-23 DIAGNOSIS — R10.9 RIGHT FLANK PAIN: Primary | ICD-10-CM

## 2018-05-23 LAB
BACTERIA SPEC CULT: NO GROWTH
HGB BLD-MCNC: 8.4 G/DL (ref 11.7–15.7)
Lab: NORMAL
SPECIMEN SOURCE: NORMAL

## 2018-05-23 PROCEDURE — 85018 HEMOGLOBIN: CPT | Performed by: OBSTETRICS & GYNECOLOGY

## 2018-05-23 PROCEDURE — 12000027 ZZH R&B OB

## 2018-05-23 PROCEDURE — 36415 COLL VENOUS BLD VENIPUNCTURE: CPT | Performed by: OBSTETRICS & GYNECOLOGY

## 2018-05-23 PROCEDURE — 25000132 ZZH RX MED GY IP 250 OP 250 PS 637: Performed by: OBSTETRICS & GYNECOLOGY

## 2018-05-23 PROCEDURE — 93970 EXTREMITY STUDY: CPT

## 2018-05-23 PROCEDURE — 40000084 ZZH STATISTIC IP LACTATION SERVICES 16-30 MIN

## 2018-05-23 RX ORDER — DIAPER,BRIEF,INFANT-TODD,DISP
EACH MISCELLANEOUS 2 TIMES DAILY
Status: DISCONTINUED | OUTPATIENT
Start: 2018-05-23 | End: 2018-05-24 | Stop reason: HOSPADM

## 2018-05-23 RX ADMIN — ACETAMINOPHEN 650 MG: 325 TABLET, FILM COATED ORAL at 16:50

## 2018-05-23 RX ADMIN — SENNOSIDES AND DOCUSATE SODIUM 1 TABLET: 8.6; 5 TABLET ORAL at 09:05

## 2018-05-23 RX ADMIN — ACETAMINOPHEN 650 MG: 325 TABLET, FILM COATED ORAL at 02:11

## 2018-05-23 RX ADMIN — SENNOSIDES AND DOCUSATE SODIUM 1 TABLET: 8.6; 5 TABLET ORAL at 20:40

## 2018-05-23 RX ADMIN — ACETAMINOPHEN 650 MG: 325 TABLET, FILM COATED ORAL at 07:32

## 2018-05-23 RX ADMIN — ACETAMINOPHEN 325 MG: 325 TABLET, FILM COATED ORAL at 12:17

## 2018-05-23 RX ADMIN — ACETAMINOPHEN 650 MG: 325 TABLET, FILM COATED ORAL at 23:45

## 2018-05-23 NOTE — PLAN OF CARE
Patient is meeting expected goals. Very attentive to  and bonding is evident. Father of baby is at the bedside and supportive. Parents very tired - baby sent to NBN with plan to come out for feedings. Mom is managing pain with tylenol.

## 2018-05-23 NOTE — PLAN OF CARE
Problem: Postpartum (Vaginal Delivery) (Adult,Obstetrics,Pediatric)  Goal: Signs and Symptoms of Listed Potential Problems Will be Absent, Minimized or Managed (Postpartum)  Signs and symptoms of listed potential problems will be absent, minimized or managed by discharge/transition of care (reference Postpartum (Vaginal Delivery) (Adult,Obstetrics,Pediatric) CPG).   Outcome: No Change  Pt up in room independently and diony well. Bonding with baby and breastfeeding well. Tylenol for pain and regular diet. Will monitor.

## 2018-05-23 NOTE — PLAN OF CARE
Problem: Patient Care Overview  Goal: Plan of Care/Patient Progress Review  Outcome: Improving  Pt up ad julian.  Vss.  Pain well controlled with tylenol, ice and tucks.  Voiding.  Breastfeeding, good latch observed.  FOB present overnight and supportive.  Continue to monitor

## 2018-05-23 NOTE — LACTATION NOTE
Lactation visit. Mom reports her nipples are very sore so offered to observe a feeding. They called back when baby was ready and assisted with feeding. Assisted in asymmetric latch with baby looking up to the breast not down, lifting breast and flanging out lower lip and mom reports a change in greater comfort. Encouraged mom to use breast compression throughout the feeding to get more milk to baby and move the feeding along better. Parents are pleased with the progress and how much more comfortable nursing is for mom. Encouraged mom to call us PRN.

## 2018-05-23 NOTE — PROGRESS NOTES
[facility] Post-Partum Progress Note          Assessment and Plan:    Assessment:   Post-partum day #1  Vacuum extraction  L&D complications: Arrest of descent  Postpartum hematuria (resolved) - Urology Consult appreciated          Doing well.  No excessive bleeding  Pain well-controlled.  Hematuria and flank pain resolved      Plan:   Ambulation encouraged  Pain control measures as needed  Reportable signs and symptoms dicussed with the patient  Anticipate discharge tomorrow           Interval History:   Doing well.  Pain is well-controlled.  No fevers.  No history of foul-smelling vaginal discharge.  Good appetite.  Denies chest pain, shortness of breath, nausea or vomiting.  Vaginal bleeding is similar to a heavy menstrual flow.  Ambulatory.  Breastfeeding well.          Significant Problems:      Patient Active Problem List   Diagnosis     Vacuum extraction, delivered, current hospitalization     Obesity during pregnancy, delivered     Postpartum hemorrhage, delivered, current hospitalization     Fetal macrosomia, delivered, current hospitalization                   Physical Exam:     All vitals stable  Patient Vitals for the past 8 hrs:   BP Temp Temp src Heart Rate Resp SpO2   05/23/18 0739 125/77 97.1  F (36.2  C) Oral 67 16 97 %   05/23/18 0204 120/68 98.8  F (37.1  C) Oral 69 18 -     Uterine fundus is firm, non-tender and at the level of the umbilicus  Ext NT with 3+ edema bilat     Lawrence Conrad MD  5/23/2018 7:46 AM

## 2018-05-23 NOTE — PROGRESS NOTES
[facility] Progress Note          Assessment and Plan:   Assessment:    Right flank pain, post partum, right hydronephrosis, gross hematuria      Plan:    Pain has resolved this AM as well as gross hematuria. No plan for urgent urologic intervention at this moment.      If no recurrence of pain prior to discharge, will plan to see her as an outpatient for repeat imaging (renal ultrasound) in 2 weeks to follow hydronephrosis. May be 2/2 pregnant state vs. Recently passed stone.      Will have our office coordinate appointment.      Bess Sams PA-C  [facility]  Cell: [phone] (text or call, Mon-Wed & Fri until 5pm)                    Interval History:   doing well; no recurrence of abdominal or flank pain. Voiding, urine clear               Review of Systems:   The 5 point Review of Systems is negative other than noted in the HPI             Medications:     Current Facility-Administered Medications Ordered in Epic   Medication Dose Route Frequency Last Rate Last Dose     acetaminophen (TYLENOL) tablet 650 mg  650 mg Oral Q4H PRN   650 mg at 05/23/18 0732     [START ON 5/24/2018] bisacodyl (DULCOLAX) Suppository 10 mg  10 mg Rectal Daily PRN         hydrocortisone 2.5 % cream   Rectal TID PRN         HYDROmorphone (PF) (DILAUDID) injection 0.3-0.5 mg  0.3-0.5 mg Intravenous Q1H PRN   0.5 mg at 05/22/18 0520     ibuprofen (ADVIL/MOTRIN) tablet 800 mg  800 mg Oral Q6H PRN         lactated ringers BOLUS 1,000 mL  1,000 mL Intravenous Once PRN         lactated ringers infusion   Intravenous Continuous 100 mL/hr at 05/22/18 0824       lanolin ointment   Topical Q1H PRN         measles, mumps and rubella vaccine (MMR) injection 0.5 mL  0.5 mL Subcutaneous Once         misoprostol (CYTOTEC) tablet 400 mcg  400 mcg Oral Once PRN         naloxone (NARCAN) injection 0.1-0.4 mg  0.1-0.4 mg Intravenous Q2 Min PRN         NO Rho (D) immune globulin (RhoGam) needed - mother Rh POSITIVE   Does  not apply Continuous PRN         No Tdap Needed - Assessment: Patient does not need Tdap vaccine   Does not apply Continuous PRN         oxyCODONE IR (ROXICODONE) tablet 5 mg  5 mg Oral Q4H PRN   5 mg at 05/22/18 0308     oxytocin (PITOCIN) 30 units in 500 mL 0.9% NaCl infusion  100 mL/hr Intravenous Continuous 100 mL/hr at 05/22/18 0200 100 mL/hr at 05/22/18 0200     oxytocin (PITOCIN) 30 units in 500 mL 0.9% NaCl infusion  340 mL/hr Intravenous Continuous PRN         oxytocin (PITOCIN) injection 10 Units  10 Units Intramuscular Once PRN         senna-docusate (SENOKOT-S;PERICOLACE) 8.6-50 MG per tablet 1 tablet  1 tablet Oral BID   1 tablet at 05/22/18 2102    Or     senna-docusate (SENOKOT-S;PERICOLACE) 8.6-50 MG per tablet 2 tablet  2 tablet Oral BID         [START ON 5/24/2018] sodium phosphate (FLEET ENEMA) 1 enema  1 enema Rectal Daily PRN         No current Epic-ordered outpatient prescriptions on file.                  Physical Exam:   Vitals were reviewed  Patient Vitals for the past 8 hrs:   BP Temp Temp src Heart Rate Resp SpO2   05/23/18 0739 125/77 97.1  F (36.2  C) Oral 67 16 97 %   05/23/18 0204 120/68 98.8  F (37.1  C) Oral 69 18 -     GEN: NAD, lying in bed  HEENT: EOMI  NECK: Supple  ABD: soft  EXT: No LE edema         Data:   No results found for: NTBNPI, NTBNP  Lab Results   Component Value Date    WBC 11.1 (H) 05/09/2018    WBC 8.8 11/21/2017    WBC 9.3 05/30/2017    HGB 8.4 (L) 05/23/2018    HGB 9.2 (L) 05/22/2018    HGB 10.4 (L) 05/21/2018    HCT 33.2 (L) 05/09/2018    HCT 39.1 11/21/2017    HCT 37.4 05/30/2017    MCV 82 05/09/2018    MCV 83 11/21/2017    MCV 87 05/30/2017     05/09/2018     11/21/2017     05/30/2017     No results found for: INR

## 2018-05-24 PROCEDURE — 25000128 H RX IP 250 OP 636: Performed by: OBSTETRICS & GYNECOLOGY

## 2018-05-24 PROCEDURE — 25000132 ZZH RX MED GY IP 250 OP 250 PS 637: Performed by: OBSTETRICS & GYNECOLOGY

## 2018-05-24 PROCEDURE — 90707 MMR VACCINE SC: CPT | Performed by: OBSTETRICS & GYNECOLOGY

## 2018-05-24 RX ORDER — ACETAMINOPHEN 325 MG/1
650 TABLET ORAL EVERY 4 HOURS PRN
Qty: 100 TABLET | COMMUNITY
Start: 2018-05-24

## 2018-05-24 RX ORDER — IBUPROFEN 200 MG
800 TABLET ORAL EVERY 6 HOURS PRN
COMMUNITY
Start: 2018-05-24 | End: 2019-06-10

## 2018-05-24 RX ORDER — FERROUS SULFATE 325(65) MG
325 TABLET ORAL
Qty: 30 TABLET | Refills: 1 | Status: SHIPPED | OUTPATIENT
Start: 2018-05-24

## 2018-05-24 RX ADMIN — SENNOSIDES AND DOCUSATE SODIUM 1 TABLET: 8.6; 5 TABLET ORAL at 09:07

## 2018-05-24 RX ADMIN — HYDROCORTISONE: 25 CREAM TOPICAL at 09:07

## 2018-05-24 RX ADMIN — MEASLES, MUMPS, AND RUBELLA VIRUS VACCINE LIVE 0.5 ML: 1000; 12500; 1000 INJECTION, POWDER, LYOPHILIZED, FOR SUSPENSION SUBCUTANEOUS at 10:18

## 2018-05-24 NOTE — PROGRESS NOTES
"Called to evaluate patient for tender \"lump\" behind right knee.    No excess swelling in lower leg.  Only localized tenderness.    On exam there is a tender 5-6cm rope-like lump c/w superficial thrombophlebitis in the right popliteal fossa.    No clinical signs of DVT.  Advised warm compresses and elevation.    Will order doppler U/S of the Lower Ext given risk factors for DVT present in this postpartum patient with marked lower extremity edema.    Minor Conrad MD  "

## 2018-05-24 NOTE — PLAN OF CARE
Problem: Patient Care Overview  Goal: Plan of Care/Patient Progress Review  Outcome: Improving  VSS; patient is meeting expected goals for this shift. Independent with self and infant cares. Preparing for discharge to home tomorrow. Managing pain with tylenol. She has developed a rash on her bottom that she thinks is from the tucks - order obtained for hydrocortisone creme. Patient says it is itchy and burns when urinating. Monitor.

## 2018-05-24 NOTE — PROVIDER NOTIFICATION
05/23/18 2048   Provider Notification   Provider Name/Title Dr. Conrad   Method of Notification Phone   Request Evaluate-Remote   Notification Reason Status Update   MD updated with pt c/o lump and pain behind right knee. MD here and will come take a look at it.

## 2018-05-24 NOTE — PROGRESS NOTES
"[facility] Postpartum Note    S:  Arpita Marcano feels good this morning. Was able to sleep last night. Pain control adequate. Lochia minimal. Voiding. Breast feeding. Mood Good.  No blood in her urine since delivery with negative CT scan.  Right lump behind her knee is stable and still a little sore.  DVT dopplers were negative.       O:  Vitals were reviewed  Blood pressure 118/71, temperature 98.3  F (36.8  C), temperature source Oral, resp. rate 16, height 1.651 m (5' 5\"), weight 106.6 kg (235 lb), last menstrual period 08/13/2017, SpO2 97 %, unknown if currently breastfeeding.      General: healthy, alert and no distress  Abd: soft, appropriately tender, fundus firm  Legs: Non-tender, 0+ pitting edema, right superficial thrombophlebitis in popliteal fossa.      RH(D)   Date Value Ref Range Status   05/22/2018 Pos  Final     Rubella: immune    Assessment and Plan:   Postpartum Day #2, status post vaginal delivery by vacuum with postpartum hematuria (resolved), doing well.  -- Discharge home  -- blood loss anemia asymptomatic and will send home on iron.    -- F/U 6 weeks w/ Primary OB  -- Discharge meds: iron and OTC tylenol/ibuprofen       Aixa Mendoza, DO  "

## 2018-05-24 NOTE — PLAN OF CARE
Problem: Postpartum (Vaginal Delivery) (Adult,Obstetrics,Pediatric)  Goal: Signs and Symptoms of Listed Potential Problems Will be Absent, Minimized or Managed (Postpartum)  Signs and symptoms of listed potential problems will be absent, minimized or managed by discharge/transition of care (reference Postpartum (Vaginal Delivery) (Adult,Obstetrics,Pediatric) CPG).   Outcome: Improving  Pt VSS. Denies pain. Minimal bleeding. Eating and drinking well. Independent in cares.  Breastfeeding infant, going well. Bonding well with infant.   Discharge instructions given. Home meds explained.  Questions answered. D/C home with partner and baby.

## 2018-05-24 NOTE — PLAN OF CARE
Rounded on patient - she is complaining about a lump behind her right knee that hurts when touched. It is not red or warm. MD called - he is in house and will come take a look at it.

## 2018-05-24 NOTE — PROVIDER NOTIFICATION
05/23/18 1653   Provider Notification   Provider Name/Title Dr. Conrad   Method of Notification Phone   Request Evaluate-Remote   Notification Reason Other   request order for hydrocortisone creme for rash.

## 2018-05-24 NOTE — LACTATION NOTE
LC follow up.  Baby has been nursing well and her nipples feel better today.  Cluster feeds reviewed.  No other concerns present.

## 2018-05-24 NOTE — PLAN OF CARE
Problem: Patient Care Overview  Goal: Plan of Care/Patient Progress Review  Outcome: Improving  Pt up ad julian.  VSS.  Pain well controlled with tylenol.  Voiding.  Breastfeeding, having some difficulty,  Assistance provided PRN.   Sore nipples, hydrogel pads given.  FOB present overnight and supportive.  Plan to d/c today.

## 2018-06-14 DIAGNOSIS — R10.9 FLANK PAIN: Primary | ICD-10-CM

## 2018-07-03 ENCOUNTER — TRANSFERRED RECORDS (OUTPATIENT)
Dept: HEALTH INFORMATION MANAGEMENT | Facility: CLINIC | Age: 28
End: 2018-07-03

## 2018-07-03 LAB — PAP-ABSTRACT: NORMAL

## 2019-06-10 ENCOUNTER — HOSPITAL ENCOUNTER (OUTPATIENT)
Dept: ULTRASOUND IMAGING | Facility: CLINIC | Age: 29
Discharge: HOME | End: 2019-06-10
Attending: INTERNAL MEDICINE | Admitting: INTERNAL MEDICINE
Payer: COMMERCIAL

## 2019-06-10 ENCOUNTER — OFFICE VISIT (OUTPATIENT)
Dept: FAMILY MEDICINE | Facility: CLINIC | Age: 29
End: 2019-06-10
Payer: COMMERCIAL

## 2019-06-10 VITALS
HEIGHT: 65 IN | HEART RATE: 104 BPM | BODY MASS INDEX: 37.69 KG/M2 | DIASTOLIC BLOOD PRESSURE: 71 MMHG | SYSTOLIC BLOOD PRESSURE: 109 MMHG | OXYGEN SATURATION: 96 % | TEMPERATURE: 100.2 F | WEIGHT: 226.2 LBS

## 2019-06-10 DIAGNOSIS — I80.3 PHLEBITIS OF RIGHT LEG: ICD-10-CM

## 2019-06-10 DIAGNOSIS — I80.3 PHLEBITIS OF RIGHT LEG: Primary | ICD-10-CM

## 2019-06-10 PROCEDURE — 99214 OFFICE O/P EST MOD 30 MIN: CPT | Performed by: INTERNAL MEDICINE

## 2019-06-10 PROCEDURE — 93971 EXTREMITY STUDY: CPT | Mod: RT

## 2019-06-10 ASSESSMENT — MIFFLIN-ST. JEOR: SCORE: 1761.92

## 2019-06-10 ASSESSMENT — ENCOUNTER SYMPTOMS
FEVER: 0
SHORTNESS OF BREATH: 0
CHILLS: 0

## 2019-06-10 NOTE — PATIENT INSTRUCTIONS
Wait for doctor to contact you with ultrasound test results.  [phone]      Patient Education     Superficial Thrombophlebitis   The superficial veins are the veins near the surface of the skin. Superficial thrombophlebitis is a problem that occurs when one or more of these veins become red, irritated, and swollen. This is most often because of a blood clot.  Causes  The problem may occur after injury to a vein. It may also occur after having an intravenous (IV) line placed. Other factors that can make the problem more likely include:    Varicose veins    Venous insufficiency    Bleeding disorders    Prolonged periods of rest and not moving around    IV drug abuse    Pregnancy    Use of birth control pills or estrogen therapy  Symptoms  Symptoms may appear in the affected area. They can include:    Pain    Tenderness    Redness    Warmth    Swelling    Hardening of the vein  In most cases, superficial thrombophlebitis resolves on its own with no problems. Treatment is focused on relieving symptoms.  Sometimes, there is a risk that the deep veins in the body may also be involved. This can lead to more serious problems. In such cases, further testing and treatments may be needed. Your healthcare provider can tell you more about this.  Home care  To help relieve pain and swelling, you may be told to:    Apply heat or cold to the affected area. Do this for up to 10 minutes as often as directed.  ? Heat: Use a warm compress, such as a heating pad.  ? Cold: Use a cold compress, such as a cold pack or bag of ice wrapped in a thin towel.    Take nonsteroidal anti-inflammatory drugs (NSAIDS), such as ibuprofen. In some cases, other pain medicines may be prescribed.    Keep the affected limb (arm or leg) raised above heart level as directed.    Wear elastic compression stockings or bandages as directed.    Don't sit or stand for long periods. Get up and walk often.  To help treat a blood clot, a blood thinner  (anticoagulant) may be prescribed. If this is needed, be sure to take the medicine exactly as directed.  Follow-up care  Follow up with your healthcare provider as advised. If imaging tests are done, they will be reviewed by a doctor. You ll be told the results and any new findings that may affect your treatment.  When to seek medical advice  Call your healthcare provider right away if any of these occur:    Fever of 100.4 F (38 C) or higher, or as directed by your healthcare provider    Increasing pain, swelling, or tenderness in the affected area    Spreading warmth or redness in the affected area  Call 911  Call 911 if any of these occur:    Trouble breathing    Chest pain or discomfort that worsens with deep breathing or coughing    Coughing (may cough up blood)    Fast or irregular heartbeat    Sweating    Anxiety    Lightheadedness, dizziness, or fainting    Extreme confusion    Extreme drowsiness or trouble waking up    New pain in the chest, arm, shoulder, neck, or upper back  Date Last Reviewed: 4/1/2018 2000-2018 [employer]. [address] [place] [address]. All rights reserved. This information is not intended as a substitute for professional medical care. Always follow your healthcare professional's instructions.

## 2019-06-10 NOTE — Clinical Note
Please abstract the following data from this visit with this patient into the appropriate field in Epic:Pap smear done on this date: 7-3-2018 (approximately), by this group: OB-Gyn, BARBARA Leong, results were negative per patient.

## 2019-06-10 NOTE — PROGRESS NOTES
"Subjective     Arpita Marcano is a 29 year old female who presents to clinic today for the following health issues:    Patient apparently had superficial phlebitis of her right leg at about the time of her delivery of her baby a year ago.  She was diagnosed with heterozygous Factor 5 Leiden.  She has been doing well until about 3 weeks prior to consultation when she developed progressive swelling and tenderness of her superficial veins at the anterolateral aspect of the right lower leg.  No right lower leg swelling or calf tenderness.  Denies chest pain or shortness of breath.      Past Medical History:   Diagnosis Date     Factor V Leiden (H)      Fetal macrosomia, delivered, current hospitalization 5/22/2018     Migraines      Obesity      Obesity affecting pregnancy in third trimester 5/21/2018     Obesity during pregnancy, delivered 5/22/2018     Postpartum hemorrhage, delivered, current hospitalization 5/22/2018     Vacuum extraction, delivered, current hospitalization 5/22/2018       Review of Systems   Constitutional: Negative for chills and fever.   Respiratory: Negative for shortness of breath.    Cardiovascular: Negative for chest pain and leg swelling.       /71 (BP Location: Left arm, Cuff Size: Adult Large)   Pulse 104   Temp 100.2  F (37.9  C) (Oral)   Ht 1.651 m (5' 5\")   Wt 102.6 kg (226 lb 3.2 oz)   LMP 05/18/2019 (Exact Date)   SpO2 96%   BMI 37.64 kg/m        Physical Exam   Constitutional: She is oriented to person, place, and time. No distress.   Cardiovascular: Normal rate, regular rhythm and normal heart sounds.   Pulmonary/Chest: Effort normal and breath sounds normal. No respiratory distress.   Musculoskeletal: Normal range of motion. She exhibits no edema or tenderness (no calf tenderness).   Neurological: She is alert and oriented to person, place, and time.   Skin: There is erythema.   Tender and firm cordlike swelling of the superficial veins at the anterolateral aspect of the " right lower leg   Vitals reviewed.        ICD-10-CM    1. Phlebitis of right leg I80.3 US Lower Extremity Venous Duplex Right       Addendum: I discussed the venous Doppler results with the patient.  I informed her about superficial thrombophlebitis and recommended conservative symptomatic care for now given absence of risk factors such as extension of the thrombus into the deep veins or infection.  I also told the patient that I will be forwarding her some information about superficial thrombophlebitis which includes home care.  Advised NSAID use.  Patient to follow-up at clinic in 1 week.      Patient Instructions   Wait for doctor to contact you with ultrasound test results.  [phone]

## 2020-03-10 ENCOUNTER — HEALTH MAINTENANCE LETTER (OUTPATIENT)
Age: 30
End: 2020-03-10

## 2020-12-27 ENCOUNTER — HEALTH MAINTENANCE LETTER (OUTPATIENT)
Age: 30
End: 2020-12-27

## 2021-04-24 ENCOUNTER — HEALTH MAINTENANCE LETTER (OUTPATIENT)
Age: 31
End: 2021-04-24

## 2021-10-09 ENCOUNTER — HEALTH MAINTENANCE LETTER (OUTPATIENT)
Age: 31
End: 2021-10-09

## 2022-03-11 ENCOUNTER — TRANSFERRED RECORDS (OUTPATIENT)
Dept: HEALTH INFORMATION MANAGEMENT | Facility: CLINIC | Age: 32
End: 2022-03-11

## 2022-04-13 ENCOUNTER — TRANSFERRED RECORDS (OUTPATIENT)
Dept: HEALTH INFORMATION MANAGEMENT | Facility: CLINIC | Age: 32
End: 2022-04-13

## 2022-04-20 ENCOUNTER — TRANSFERRED RECORDS (OUTPATIENT)
Dept: HEALTH INFORMATION MANAGEMENT | Facility: CLINIC | Age: 32
End: 2022-04-20

## 2022-05-04 ENCOUNTER — TRANSFERRED RECORDS (OUTPATIENT)
Dept: HEALTH INFORMATION MANAGEMENT | Facility: CLINIC | Age: 32
End: 2022-05-04

## 2022-05-21 ENCOUNTER — HEALTH MAINTENANCE LETTER (OUTPATIENT)
Age: 32
End: 2022-05-21

## 2022-07-23 ENCOUNTER — TRANSCRIBE ORDERS (OUTPATIENT)
Dept: OTHER | Age: 32
End: 2022-07-23

## 2022-07-23 DIAGNOSIS — R00.2 PALPITATIONS: Primary | ICD-10-CM

## 2022-07-23 DIAGNOSIS — U09.9 POST-COVID SYNDROME: ICD-10-CM

## 2022-07-23 DIAGNOSIS — G90.A POTS (POSTURAL ORTHOSTATIC TACHYCARDIA SYNDROME): ICD-10-CM

## 2022-08-05 ENCOUNTER — TRANSFERRED RECORDS (OUTPATIENT)
Dept: HEALTH INFORMATION MANAGEMENT | Facility: CLINIC | Age: 32
End: 2022-08-05

## 2022-08-09 ENCOUNTER — APPOINTMENT (OUTPATIENT)
Dept: GENERAL RADIOLOGY | Facility: CLINIC | Age: 32
End: 2022-08-09
Attending: EMERGENCY MEDICINE
Payer: COMMERCIAL

## 2022-08-09 ENCOUNTER — HOSPITAL ENCOUNTER (EMERGENCY)
Facility: CLINIC | Age: 32
Discharge: HOME | End: 2022-08-09
Attending: EMERGENCY MEDICINE | Admitting: EMERGENCY MEDICINE
Payer: COMMERCIAL

## 2022-08-09 VITALS
BODY MASS INDEX: 34.55 KG/M2 | TEMPERATURE: 97 F | OXYGEN SATURATION: 99 % | WEIGHT: 215 LBS | HEIGHT: 66 IN | DIASTOLIC BLOOD PRESSURE: 75 MMHG | SYSTOLIC BLOOD PRESSURE: 115 MMHG | HEART RATE: 75 BPM | RESPIRATION RATE: 12 BRPM

## 2022-08-09 DIAGNOSIS — R07.9 CHEST PAIN, UNSPECIFIED TYPE: ICD-10-CM

## 2022-08-09 DIAGNOSIS — R00.2 PALPITATIONS: ICD-10-CM

## 2022-08-09 LAB
ALBUMIN SERPL-MCNC: 4.2 G/DL (ref 3.4–5)
ALBUMIN UR-MCNC: NEGATIVE MG/DL
ALP SERPL-CCNC: 59 U/L (ref 40–150)
ALT SERPL W P-5'-P-CCNC: 20 U/L (ref 0–50)
ANION GAP SERPL CALCULATED.3IONS-SCNC: 5 MMOL/L (ref 3–14)
APPEARANCE UR: CLEAR
AST SERPL W P-5'-P-CCNC: 9 U/L (ref 0–45)
BACTERIA #/AREA URNS HPF: ABNORMAL /HPF
BASOPHILS # BLD AUTO: 0 10E3/UL (ref 0–0.2)
BASOPHILS NFR BLD AUTO: 0 %
BILIRUB SERPL-MCNC: 1.4 MG/DL (ref 0.2–1.3)
BILIRUB UR QL STRIP: NEGATIVE
BUN SERPL-MCNC: 8 MG/DL (ref 7–30)
CALCIUM SERPL-MCNC: 9.1 MG/DL (ref 8.5–10.1)
CHLORIDE BLD-SCNC: 107 MMOL/L (ref 94–109)
CO2 SERPL-SCNC: 26 MMOL/L (ref 20–32)
COLOR UR AUTO: ABNORMAL
CREAT SERPL-MCNC: 0.83 MG/DL (ref 0.52–1.04)
D DIMER PPP FEU-MCNC: 0.39 UG/ML FEU (ref 0–0.5)
EOSINOPHIL # BLD AUTO: 0 10E3/UL (ref 0–0.7)
EOSINOPHIL NFR BLD AUTO: 1 %
ERYTHROCYTE [DISTWIDTH] IN BLOOD BY AUTOMATED COUNT: 11.9 % (ref 10–15)
GFR SERPL CREATININE-BSD FRML MDRD: >90 ML/MIN/1.73M2
GLUCOSE BLD-MCNC: 127 MG/DL (ref 70–99)
GLUCOSE UR STRIP-MCNC: NEGATIVE MG/DL
HCG UR QL: NEGATIVE
HCT VFR BLD AUTO: 43.6 % (ref 35–47)
HGB BLD-MCNC: 14.5 G/DL (ref 11.7–15.7)
HGB UR QL STRIP: NEGATIVE
IMM GRANULOCYTES # BLD: 0 10E3/UL
IMM GRANULOCYTES NFR BLD: 0 %
KETONES UR STRIP-MCNC: NEGATIVE MG/DL
LEUKOCYTE ESTERASE UR QL STRIP: ABNORMAL
LYMPHOCYTES # BLD AUTO: 1.7 10E3/UL (ref 0.8–5.3)
LYMPHOCYTES NFR BLD AUTO: 34 %
MCH RBC QN AUTO: 28.3 PG (ref 26.5–33)
MCHC RBC AUTO-ENTMCNC: 33.3 G/DL (ref 31.5–36.5)
MCV RBC AUTO: 85 FL (ref 78–100)
MONOCYTES # BLD AUTO: 0.3 10E3/UL (ref 0–1.3)
MONOCYTES NFR BLD AUTO: 6 %
NEUTROPHILS # BLD AUTO: 3 10E3/UL (ref 1.6–8.3)
NEUTROPHILS NFR BLD AUTO: 59 %
NITRATE UR QL: NEGATIVE
NRBC # BLD AUTO: 0 10E3/UL
NRBC BLD AUTO-RTO: 0 /100
PH UR STRIP: 6 [PH] (ref 5–7)
PLATELET # BLD AUTO: 217 10E3/UL (ref 150–450)
POTASSIUM BLD-SCNC: 3.7 MMOL/L (ref 3.4–5.3)
PROT SERPL-MCNC: 7.3 G/DL (ref 6.8–8.8)
RBC # BLD AUTO: 5.12 10E6/UL (ref 3.8–5.2)
RBC URINE: <1 /HPF
SODIUM SERPL-SCNC: 138 MMOL/L (ref 133–144)
SP GR UR STRIP: 1.01 (ref 1–1.03)
SQUAMOUS EPITHELIAL: 1 /HPF
TROPONIN I SERPL HS-MCNC: <3 NG/L
TSH SERPL DL<=0.005 MIU/L-ACNC: 0.76 MU/L (ref 0.4–4)
UROBILINOGEN UR STRIP-MCNC: NORMAL MG/DL
WBC # BLD AUTO: 5.1 10E3/UL (ref 4–11)
WBC URINE: <1 /HPF

## 2022-08-09 PROCEDURE — 84484 ASSAY OF TROPONIN QUANT: CPT | Performed by: EMERGENCY MEDICINE

## 2022-08-09 PROCEDURE — 36415 COLL VENOUS BLD VENIPUNCTURE: CPT | Performed by: EMERGENCY MEDICINE

## 2022-08-09 PROCEDURE — 85379 FIBRIN DEGRADATION QUANT: CPT | Performed by: EMERGENCY MEDICINE

## 2022-08-09 PROCEDURE — 81001 URINALYSIS AUTO W/SCOPE: CPT | Performed by: EMERGENCY MEDICINE

## 2022-08-09 PROCEDURE — 71046 X-RAY EXAM CHEST 2 VIEWS: CPT

## 2022-08-09 PROCEDURE — 84443 ASSAY THYROID STIM HORMONE: CPT | Performed by: EMERGENCY MEDICINE

## 2022-08-09 PROCEDURE — 80053 COMPREHEN METABOLIC PANEL: CPT | Performed by: EMERGENCY MEDICINE

## 2022-08-09 PROCEDURE — 93005 ELECTROCARDIOGRAM TRACING: CPT

## 2022-08-09 PROCEDURE — 85004 AUTOMATED DIFF WBC COUNT: CPT | Performed by: EMERGENCY MEDICINE

## 2022-08-09 PROCEDURE — 81025 URINE PREGNANCY TEST: CPT | Performed by: EMERGENCY MEDICINE

## 2022-08-09 PROCEDURE — 99285 EMERGENCY DEPT VISIT HI MDM: CPT | Mod: 25

## 2022-08-09 ASSESSMENT — ENCOUNTER SYMPTOMS
ABDOMINAL PAIN: 0
PALPITATIONS: 1
LIGHT-HEADEDNESS: 1
SHORTNESS OF BREATH: 1

## 2022-08-09 ASSESSMENT — ACTIVITIES OF DAILY LIVING (ADL)
ADLS_ACUITY_SCORE: 35
ADLS_ACUITY_SCORE: 35

## 2022-08-09 NOTE — ED TRIAGE NOTES
Pt states an increase of palpitations;  States that over the last day or so, she has felt lightheaded with them.       Triage Assessment     Row Name 08/09/22 0868       Triage Assessment (Adult)    Airway WDL WDL       Respiratory WDL    Respiratory WDL WDL       Skin Circulation/Temperature WDL    Skin Circulation/Temperature WDL WDL       Cardiac WDL    Cardiac WDL X  Pt states she has been having palpitations.       Peripheral/Neurovascular WDL    Peripheral Neurovascular WDL WDL       Cognitive/Neuro/Behavioral WDL    Cognitive/Neuro/Behavioral WDL WDL

## 2022-08-09 NOTE — ED PROVIDER NOTES
History   Chief Complaint:  Palpitations     The history is provided by the patient and medical records.      Arpita Marcano is a 32 year old female with history of anxiety and factor V Leiden who presents with palpitations. The patient states she has had intermittent episodes of palpations for the past 4 months. She was evaluated in the emergency department in mid April with similar symptoms and unremarkable work up. She followed up with cardiology with a negative echocardiogram and a Zio patch. She began to improve afterwards, however symptoms began to worsen over the past several weeks. She endorses racing heart rate, shortness of breath, and lightheadedness. Her heart rate ranges from 60s-140s. She has had an episode of syncope after experiencing palpations as well. She was seen by her PCP last week and has an appointment scheduled with a POTS specialist at the [facility] on the 22nd of August. She endorses good PO intake and has been increasing her fluid intake. She has lost some weight as well and is regularly active, as she works at a . She notes episodes of palpitations usually present at night and its onset wakes her up. She notes she does have stress and anxiety, however this has been well managed. She has history of superficial venous thrombosis, however no DVT. She denies abdominal pain.     Review of Systems   Respiratory: Positive for shortness of breath.    Cardiovascular: Positive for palpitations.   Gastrointestinal: Negative for abdominal pain.   Neurological: Positive for light-headedness.   All other systems reviewed and are negative.    Allergies:  Penicillins    Medications:  The patient is not currently taking any prescribed medications.    Past Medical History:     Generalized anxiety disorder  Factor V Leiden mutation, heterozygous   Obesity     Past Surgical History:    Bunionectomy  Dilation and curettage  Tonsillectomy      Family History:    Father: Breast Cancer,  "Hypercholesterolemia     Social History:  The patient was unaccompanied to the emergency department.  PCP:  Adelfo Ernandez    Physical Exam     Patient Vitals for the past 24 hrs:   BP Temp Temp src Pulse Resp SpO2 Height Weight   08/09/22 1400 117/67 -- -- 68 16 100 % -- --   08/09/22 1253 105/80 -- -- 66 22 -- -- --   08/09/22 0840 123/73 97  F (36.1  C) Temporal 67 18 100 % 1.676 m (5' 6\") 97.5 kg (215 lb)     Physical Exam  Eye:  Pupils are equal, round, and reactive.  Extraocular movements intact.    ENT:  No rhinorrhea.  Moist mucus membranes.  Normal tongue and tonsil.    Cardiac:  Regular rate and rhythm.  No murmurs, gallops, or rubs.    Pulmonary:  Clear to auscultation bilaterally.  No wheezes, rales, or rhonchi.    Abdomen:  Positive bowel sounds.  Abdomen is soft and non-distended, without focal tenderness.    Musculoskeletal:  Normal movement of all extremities without evidence for deficit.    Skin:  Warm and dry without rashes.    Neurologic:  Non-focal exam without asymmetric weakness or numbness.     Psychiatric:  Anxious affect with appropriate interaction with examiner.    Emergency Department Course   ECG  ECG taken at 0841, ECG read at 1256  Normal sinus rhythm with sinus arrhythmia  Normal ECG  Rate 90 bpm. MT interval 140. QRS duration 80. QT/QTc 342/418. P-R-T axes 64 58 25.    Imaging:  Chest XR,  PA & LAT   Final Result   IMPRESSION: No acute cardiopulmonary disease.      ANA JONES MD            SYSTEM ID:  [id number]      Report per radiology    Laboratory:  Labs Ordered and Resulted from Time of ED Arrival to Time of ED Departure   COMPREHENSIVE METABOLIC PANEL - Abnormal       Result Value    Sodium 138      Potassium 3.7      Chloride 107      Carbon Dioxide (CO2) 26      Anion Gap 5      Urea Nitrogen 8      Creatinine 0.83      Calcium 9.1      Glucose 127 (*)     Alkaline Phosphatase 59      AST 9      ALT 20      Protein Total 7.3      Albumin 4.2      " Bilirubin Total 1.4 (*)     GFR Estimate >90     ROUTINE UA WITH MICROSCOPIC REFLEX TO CULTURE - Abnormal    Color Urine Light Yellow      Appearance Urine Clear      Glucose Urine Negative      Bilirubin Urine Negative      Ketones Urine Negative      Specific Gravity Urine 1.010      Blood Urine Negative      pH Urine 6.0      Protein Albumin Urine Negative      Urobilinogen Urine Normal      Nitrite Urine Negative      Leukocyte Esterase Urine Small (*)     Bacteria Urine Few (*)     RBC Urine <1      WBC Urine <1      Squamous Epithelials Urine 1     D DIMER QUANTITATIVE - Normal    D-Dimer Quantitative 0.39     TROPONIN I - Normal    Troponin I High Sensitivity <3     TSH WITH FREE T4 REFLEX - Normal    TSH 0.76     CBC WITH PLATELETS AND DIFFERENTIAL    WBC Count 5.1      RBC Count 5.12      Hemoglobin 14.5      Hematocrit 43.6      MCV 85      MCH 28.3      MCHC 33.3      RDW 11.9      Platelet Count 217      % Neutrophils 59      % Lymphocytes 34      % Monocytes 6      % Eosinophils 1      % Basophils 0      % Immature Granulocytes 0      NRBCs per 100 WBC 0      Absolute Neutrophils 3.0      Absolute Lymphocytes 1.7      Absolute Monocytes 0.3      Absolute Eosinophils 0.0      Absolute Basophils 0.0      Absolute Immature Granulocytes 0.0      Absolute NRBCs 0.0     HCG QUALITATIVE URINE      Emergency Department Course:     Reviewed:  I reviewed nursing notes, vitals, past medical history and Care Everywhere    Assessments:  1256 I obtained history and examined the patient as noted above.   1436 I rechecked the patient and explained findings.     Disposition:  The patient was discharged to home.     Impression & Plan   Medical Decision Making:  This delightful 32-year-old woman with a history of palpitations and anxiety presents to us with return of her symptoms.  She underwent a very thorough work-up in April of this year where she had a full emergency evaluation followed by outpatient follow-up with  cardiology, undergoing an echocardiogram and a Zio patch.  She notes that she had improvement in her symptoms after these visits, with no clear cause found.  However, symptoms have since returned.  She wakes up in the middle of the night with palpitations and spells of tachycardia.  She is also complaining of some left-sided chest pain which has been rather constant.  She has a history of factor V Leiden and is concerned about a PE.    On my exam, she is anxious but otherwise with normal vital signs.  EKG is normal.  D-dimer is negative and I feel this rules her out and she otherwise has no other risk factors beyond her factor V Leiden and 1 prior superficial thrombus.  Troponin is negative despite several days of symptoms.  TSH is normal.  Hemoglobin is normal.  Electrolytes are reassuring.  Chest x-ray is clear.  I feel life-threatening etiologies of chest pain and been ruled out and she feels reassured knowing that her tests are normal.  She has an outpatient cardiology appointment scheduled for next month with a POTS specialist.  She is comfort with plan for discharge and understands she can return at any point for worsening of condition.  We will not repeat her Zio patch or other Holter monitoring considering her recent work-up.    Diagnosis:    ICD-10-CM    1. Palpitations  R00.2    2. Chest pain, unspecified type  R07.9      Scribe Disclosure:  I, Angela Lew, am serving as a scribe at 12:52 PM on 8/9/2022 to document services personally performed by Trierweiler, Chad A, MD based on my observations and the provider's statements to me.     Trierweiler, Chad A, MD  08/09/22 1812

## 2022-08-11 NOTE — CONFIDENTIAL NOTE
[facility] 8/11/22 [facility]  Fax #[phone]   Action Taken Requested:    EKG Strips    Ziopatch   8-5-22, 4-13-22, 3-11-22    5-4-22 (or most recent)     Sent EKGs and zio to scanning 8/16     [facility] 8/11/22 [facility]  Fax #[phone]   Action Taken Requested:     Echo     CTA Chest   5-19-22 11-8-21     Confirmed images are in PACs 8/16

## 2022-08-17 ASSESSMENT — ENCOUNTER SYMPTOMS
ARTHRALGIAS: 1
LOSS OF CONSCIOUSNESS: 1
NUMBNESS: 1
LIGHT-HEADEDNESS: 1
BACK PAIN: 1
SHORTNESS OF BREATH: 1
NECK PAIN: 1
DIZZINESS: 1
DECREASED CONCENTRATION: 1
NERVOUS/ANXIOUS: 1
MYALGIAS: 1
TINGLING: 1
HEADACHES: 1
INSOMNIA: 1
PALPITATIONS: 1
LEG PAIN: 1

## 2022-08-18 NOTE — PROGRESS NOTES
[facility]      Referring provider:Martha Brunner DO    HPI: Ms. Arpita Marcano is a 32 year old  female with PMH significant for    -Generalized anxiety disorder  -Factor V Leiden mutation, heterozygous   -Obesity   -Post COVID syndrome (COVID infection in 2020 and 1/2022)    Patient had COVID-19 infection in December 2020 and again in January 2022.      Patient reports feeling palpitations since March of this year.  Initially her symptoms were every couple of weeks or so but since June of this year she is feeling palpitations almost daily.  She tells me that it is almost constant throughout the whole day.  Reports associated nonexertional chest discomfort, dizziness and shortness of breath.  Patient reports worsening symptoms with standing.  Sometimes she wakes up in the middle of the night with palpitations and cannot sleep.  She has passed out 2 times.  First episode was in April of this year.  When she was discharged from the ER she felt dizzy and got re-admitted again and she passed out in the lobby of the ER.  She passed out in May of this year at home.  A week ago she was at home and came very close to passing out but she was able to abort it by sitting down which improved her symptoms.  She tells me that her watch reports heart rate around 120-150 most of the time.  She has seen her primary care physician and started on Cymbalta to improve anxiety  which has helped a little bit but not much.      Patient was seen in cardiology clinic at [facility] in May of this year.  She underwent extensive work-up including Zio patch and echocardiogram.  Also in the ER she underwent several tests including chest x-ray, CT PE, troponin, TSH which were all unremarkable.  Patient tells me that every time she goes to ER she has a lot of tests done and she  was told that she does not have any heart problem.  She tells me that she is frustrated with this.    No prior history of cardiac disease.  She tells me that she did not have any of the symptoms stated above prior to March of this year.  Does not smoke.  No alcohol.  No drug abuse.  No history of diabetes, hypertension.  No family history of premature CAD or sudden cardiac death.    Patient is currently on Cymbalta.  No other medications.    Recent labs 8/9/2022 showed normal hemoglobin, normal TSH and BMP.    Medications, personal, family, and social history reviewed with patient and revised.    PAST MEDICAL HISTORY:  Past Medical History:   Diagnosis Date     Factor V Leiden (H)      Fetal macrosomia, delivered, current hospitalization 5/22/2018     Migraines      Obesity      Obesity affecting pregnancy in third trimester 5/21/2018     Obesity during pregnancy, delivered 5/22/2018     Postpartum hemorrhage, delivered, current hospitalization 5/22/2018     Vacuum extraction, delivered, current hospitalization 5/22/2018       CURRENT MEDICATIONS:  Current Outpatient Medications   Medication Sig Dispense Refill     acetaminophen (TYLENOL) 325 MG tablet Take 2 tablets (650 mg) by mouth every 4 hours as needed for mild pain or fever (greater than or equal to 38  C /100.4  F (oral) or 38.5  C/ 101.4  F (core).) 100 tablet      ferrous sulfate (IRON) 325 (65 Fe) MG tablet Take 1 tablet (325 mg) by mouth daily (with breakfast) 30 tablet 1       PAST SURGICAL HISTORY:  Past Surgical History:   Procedure Laterality Date     BUNIONECTOMY       DILATION AND CURETTAGE SUCTION N/A 5/30/2017    Procedure: DILATION AND CURETTAGE SUCTION;  DILATION AND CURETTAGE SUCTION ;  Surgeon: Cornell Nogueira MD;  Location: [facility] OR     HYSTEROSCOPY      endometriosis, done in [place]     LAPAROSCOPY      endometriosis, done in [place]     TONSILLECTOMY       wisdom teeth         ALLERGIES:     Allergies   Allergen Reactions     Penicillins Rash     " As a child       FAMILY HISTORY:  No family history on file.      SOCIAL HISTORY:  Social History     Tobacco Use     Smoking status: Former Smoker     Smokeless tobacco: Never Used     Tobacco comment: quit when found out pregnant   Substance Use Topics     Alcohol use: Yes     Comment: 2 drinks per month     Drug use: No       ROS:   Answers for HPI/ROS submitted by the patient on 8/17/2022  General Symptoms: No  Skin Symptoms: No  HENT Symptoms: No  EYE SYMPTOMS: No  HEART SYMPTOMS: Yes  LUNG SYMPTOMS: Yes  INTESTINAL SYMPTOMS: No  URINARY SYMPTOMS: No  GYNECOLOGIC SYMPTOMS: No  BREAST SYMPTOMS: No  SKELETAL SYMPTOMS: Yes  BLOOD SYMPTOMS: No  NERVOUS SYSTEM SYMPTOMS: Yes  MENTAL HEALTH SYMPTOMS: Yes  Chest pain or pressure: Yes  Fast or irregular heartbeat: Yes  Pain in legs with walking: Yes  Varicose veins: Yes  Light-headedness: Yes  Difficulty breating or shortness of breath: Yes  Back pain: Yes  Muscle aches: Yes  Neck pain: Yes  Joint pain: Yes  Dizziness or trouble with balance: Yes  Fainting or black-out spells: Yes  Headache: Yes  Tingling: Yes  Numbness: Yes  Nervous or Anxious: Yes  Trouble sleeping: Yes  Trouble thinking or concentrating: Yes      Exam:  /73 (BP Location: Right arm, Patient Position: Chair, Cuff Size: Adult Regular)   Pulse 79   Ht 1.65 m (5' 4.96\")   Wt 95.3 kg (210 lb 1.6 oz)   LMP 07/22/2022 (Exact Date)   SpO2 98%   BMI 35.00 kg/m    GENERAL APPEARANCE: alert and no distress  HEENT: no icterus, no central cyanosis  LYMPH/NECK: no adenopathy, no asymmetry, JVP not elevated  RESPIRATORY: lungs clear to auscultation - no rales, rhonchi or wheezes, no use of accessory muscles, no retractions, respirations are unlabored, normal respiratory rate  CARDIOVASCULAR: regular rhythm, normal S1, S2, no S3 or S4 and no murmur, click or rub, precordium quiet with normal PMI.  GI: soft, non tender  EXTREMITIES: no edema  NEURO: alert, normal speech,and affect  SKIN: no ecchymoses, no " ange     I have reviewed the labs and personally reviewed the imaging below and made my comment in the assessment and plan.    Labs:  CBC RESULTS:   Lab Results   Component Value Date    WBC 5.1 08/09/2022    WBC 11.1 (H) 05/09/2018    RBC 5.12 08/09/2022    RBC 4.03 05/09/2018    HGB 14.5 08/09/2022    HGB 8.4 (L) 05/23/2018    HCT 43.6 08/09/2022    HCT 33.2 (L) 05/09/2018    MCV 85 08/09/2022    MCV 82 05/09/2018    MCH 28.3 08/09/2022    MCH 26.1 (L) 05/09/2018    MCHC 33.3 08/09/2022    MCHC 31.6 05/09/2018    RDW 11.9 08/09/2022    RDW 15.1 (H) 05/09/2018     08/09/2022     05/09/2018       BMP RESULTS:  Lab Results   Component Value Date     08/09/2022     11/21/2017    POTASSIUM 3.7 08/09/2022    POTASSIUM 4.2 11/21/2017    CHLORIDE 107 08/09/2022    CHLORIDE 105 11/21/2017    CO2 26 08/09/2022    CO2 25 11/21/2017    ANIONGAP 5 08/09/2022    ANIONGAP 7 11/21/2017     (H) 08/09/2022    GLC 90 11/21/2017    BUN 8 08/09/2022    BUN 6 (L) 11/21/2017    CR 0.83 08/09/2022    CR 0.69 05/09/2018    GFRESTIMATED >90 08/09/2022    GFRESTIMATED >90 05/09/2018    GFRESTBLACK >90 05/09/2018    ERIC 9.1 08/09/2022    ERIC 8.9 11/21/2017      Following test results personally reviewed from care everywhere.  Echocardiogram 5/19/2022  Unremarkable echo.   Left ventricular ejection fraction is visually estimated at 60%.   No significant valvular abnormalities were identified.   Sinus arrhythmia [normal].   No prior studies.     Zio patch 5/4/2022  1.  Zio is unremarkable   2.  Symptoms mostly occurred in association with sinus rhythm without   ectopy; rare/occasional PACs or PVCs were present with some.     Signature:   Joao Valle MD       Indication: (R00.2), Palpitations     Enrollment Period:   04/20/22, 05:03pm to 04/30/22, 02:03pm: 9 days 21 hours     Analysis Time (after artifact removed):   9 days 17 hours     Total Triggers:   23     Total Diaries:   18     Findings within +/- 45  sec of triggered events or diary entries:   Sinus  bpm, SVE(s), VE(s)     Longest Ventricular Bigeminy Episode: 0 s   Longest Ventricular Trigeminy Episode: 0 s     Findings:   Patient had a min HR of 41 bpm, max sinus HR of 156 bpm, and avg HR of 82   bpm. Predominant underlying rhythm was Sinus  Rhythm.     Slight P wave morphology changes were noted.     1 run of Supraventricular Tachycardia occurred lasting 15.7 secs with a max    rate of 122 bpm (avg 115 bpm).     Isolated SVEs were rare (<1.0%), SVE Couplets were rare (<1.0%), and SVE   Triplets were rare (<1.0%).     Isolated VEs were rare (<1.0%), VE Couplets were rare (<1.0%), and no VE   Triplets were present.       EKG August 5, 2022 shows sinus arrhythmia otherwise normal.          Assessment and Plan:     #Vasovagal syncope  #POTS like symptoms  -Patient reports 2 episodes of syncope and daily palpitations associated with shortness of breath and chest discomfort.  Patient was completely asymptomatic prior to March of this year.  So all the symptoms and syncope are very new to her. Since June of this year she is having daily symptoms.  Cardiac work-up including EKG, echocardiogram and Zio patch so far has been unremarkable.  Lab tests all are unremarkable.  We have performed sitting and standing test in clinic today which showed no significant change in blood pressure or heart rate.  At the end of 5 minutes she reported feeling dizzy but blood pressure was 115/81 mmHg and heart rate was 94 bpm. I think her symptoms are consistent with POTS and vasovagal syncope however we do not have an objective evidence of POTS from sitting and standing test in clinic today.      Recommendations  -Aim for 5 to 10 g of sodium chloride per day  -Start metoprolol 12.5 mg twice daily, recommend to increase to 25 mg twice daily in few weeks if symptoms improve but do not resolve  -Prescribed sodium chloride 1 g 3 times daily  -Aim 60 to 80 ounces per day (patient is  currently drinking 120 ounces per day)   -Avoid specific triggers for vasovagal syncope like standing for long time, hot tub, hot shower  -Recommend strength training exercise  -Patient will send [employer] message in a month to update me about her symptoms.    Return to clinic 3 months.    Total time spent today for this visit is 60 minutes including precharting, face-to-face clinic visit, review of labs/imaging and medical documentation.    Please donot hesitate to contact me if you have any questions or concerns. Again, thank you for allowing me to participate in the care of your patient.    Fernanda TORRES MD  [facility] Division of Cardiology  Pager [phone]

## 2022-08-22 ENCOUNTER — OFFICE VISIT (OUTPATIENT)
Dept: CARDIOLOGY | Facility: CLINIC | Age: 32
End: 2022-08-22
Attending: INTERNAL MEDICINE
Payer: COMMERCIAL

## 2022-08-22 ENCOUNTER — PRE VISIT (OUTPATIENT)
Dept: CARDIOLOGY | Facility: CLINIC | Age: 32
End: 2022-08-22

## 2022-08-22 VITALS
HEIGHT: 65 IN | HEART RATE: 79 BPM | WEIGHT: 210.1 LBS | OXYGEN SATURATION: 98 % | BODY MASS INDEX: 35 KG/M2 | DIASTOLIC BLOOD PRESSURE: 73 MMHG | SYSTOLIC BLOOD PRESSURE: 109 MMHG

## 2022-08-22 DIAGNOSIS — U09.9 POST-COVID SYNDROME: ICD-10-CM

## 2022-08-22 DIAGNOSIS — R00.2 PALPITATIONS: ICD-10-CM

## 2022-08-22 DIAGNOSIS — R55 VASOVAGAL SYNCOPE: Primary | ICD-10-CM

## 2022-08-22 DIAGNOSIS — G90.A POTS (POSTURAL ORTHOSTATIC TACHYCARDIA SYNDROME): ICD-10-CM

## 2022-08-22 PROCEDURE — 99205 OFFICE O/P NEW HI 60 MIN: CPT | Performed by: INTERNAL MEDICINE

## 2022-08-22 PROCEDURE — G0463 HOSPITAL OUTPT CLINIC VISIT: HCPCS

## 2022-08-22 RX ORDER — METOPROLOL TARTRATE 25 MG/1
12.5 TABLET, FILM COATED ORAL 2 TIMES DAILY
Qty: 45 TABLET | Refills: 3 | Status: SHIPPED | OUTPATIENT
Start: 2022-08-22

## 2022-08-22 RX ORDER — DULOXETIN HYDROCHLORIDE 30 MG/1
30 CAPSULE, DELAYED RELEASE ORAL DAILY
COMMUNITY
Start: 2022-03-29 | End: 2023-03-29

## 2022-08-22 RX ORDER — SODIUM CHLORIDE 1 G/1
1 TABLET ORAL 3 TIMES DAILY
Qty: 90 TABLET | Refills: 3 | Status: SHIPPED | OUTPATIENT
Start: 2022-08-22

## 2022-08-22 ASSESSMENT — PAIN SCALES - GENERAL: PAINLEVEL: MILD PAIN (3)

## 2022-08-22 NOTE — PATIENT INSTRUCTIONS
Metoprolol RX sent to pharmacy.  12.5 twice daily for now, if you find this is helpful you can increase dose to 25 mg twice daily in a few weeks.     Salt tablets - RX sent to pharmacy.  Aim for a total salt intake of 5-10 grams per day.    Recommend strength training exercises.     Send a [employer] message to us in one month to report on the metoprolol (is it addressing symptoms and have you increased the dose to 25 mg twice daily, or not)    Follow up with Dr. Stephens in 3 months.

## 2022-08-22 NOTE — LETTER
8/22/2022      RE: Arpita Marcano  [address]  [place] [address]       Dear Colleague,    Thank you for the opportunity to participate in the care of your patient, Arpita Marcano, at the [facility] [place] at [facility]. Please see a copy of my visit note below.                                                                                                                                              [facility]      Referring provider:Martha Brunner DO    HPI: Ms. Arpita Marcano is a 32 year old  female with PMH significant for    -Generalized anxiety disorder  -Factor V Leiden mutation, heterozygous   -Obesity   -Post COVID syndrome (COVID infection in 2020 and 1/2022)    Patient had COVID-19 infection in December 2020 and again in January 2022.      Patient reports feeling palpitations since March of this year.  Initially her symptoms were every couple of weeks or so but since June of this year she is feeling palpitations almost daily.  She tells me that it is almost constant throughout the whole day.  Reports associated nonexertional chest discomfort, dizziness and shortness of breath.  Patient reports worsening symptoms with standing.  Sometimes she wakes up in the middle of the night with palpitations and cannot sleep.  She has passed out 2 times.  First episode was in April of this year.  When she was discharged from the ER she felt dizzy and got re-admitted again and she passed out in the lobby of the ER.  She passed out in May of this year at home.  A week ago she was at home and came very close to passing out but she was able to abort it by sitting down which improved her symptoms.  She tells me that her watch reports heart rate around 120-150 most of the time.  She has seen her primary care physician and started on Cymbalta to improve anxiety  which has helped a little bit but not much.       Patient was seen in cardiology clinic at [facility] in May of this year.  She underwent extensive work-up including Zio patch and echocardiogram.  Also in the ER she underwent several tests including chest x-ray, CT PE, troponin, TSH which were all unremarkable.  Patient tells me that every time she goes to ER she has a lot of tests done and she was told that she does not have any heart problem.  She tells me that she is frustrated with this.    No prior history of cardiac disease.  She tells me that she did not have any of the symptoms stated above prior to March of this year.  Does not smoke.  No alcohol.  No drug abuse.  No history of diabetes, hypertension.  No family history of premature CAD or sudden cardiac death.    Patient is currently on Cymbalta.  No other medications.    Recent labs 8/9/2022 showed normal hemoglobin, normal TSH and BMP.    Medications, personal, family, and social history reviewed with patient and revised.    PAST MEDICAL HISTORY:  Past Medical History:   Diagnosis Date     Factor V Leiden (H)      Fetal macrosomia, delivered, current hospitalization 5/22/2018     Migraines      Obesity      Obesity affecting pregnancy in third trimester 5/21/2018     Obesity during pregnancy, delivered 5/22/2018     Postpartum hemorrhage, delivered, current hospitalization 5/22/2018     Vacuum extraction, delivered, current hospitalization 5/22/2018       CURRENT MEDICATIONS:  Current Outpatient Medications   Medication Sig Dispense Refill     acetaminophen (TYLENOL) 325 MG tablet Take 2 tablets (650 mg) by mouth every 4 hours as needed for mild pain or fever (greater than or equal to 38  C /100.4  F (oral) or 38.5  C/ 101.4  F (core).) 100 tablet      ferrous sulfate (IRON) 325 (65 Fe) MG tablet Take 1 tablet (325 mg) by mouth daily (with breakfast) 30 tablet 1       PAST SURGICAL HISTORY:  Past Surgical History:   Procedure Laterality Date     BUNIONECTOMY       DILATION AND CURETTAGE SUCTION  "N/A 5/30/2017    Procedure: DILATION AND CURETTAGE SUCTION;  DILATION AND CURETTAGE SUCTION ;  Surgeon: Cornell Nogueira MD;  Location: [facility] OR     HYSTEROSCOPY      endometriosis, done in [place]     LAPAROSCOPY      endometriosis, done in [place]     TONSILLECTOMY       wisdom teeth         ALLERGIES:     Allergies   Allergen Reactions     Penicillins Rash     As a child       FAMILY HISTORY:  No family history on file.      SOCIAL HISTORY:  Social History     Tobacco Use     Smoking status: Former Smoker     Smokeless tobacco: Never Used     Tobacco comment: quit when found out pregnant   Substance Use Topics     Alcohol use: Yes     Comment: 2 drinks per month     Drug use: No       ROS:   Answers for HPI/ROS submitted by the patient on 8/17/2022  General Symptoms: No  Skin Symptoms: No  HENT Symptoms: No  EYE SYMPTOMS: No  HEART SYMPTOMS: Yes  LUNG SYMPTOMS: Yes  INTESTINAL SYMPTOMS: No  URINARY SYMPTOMS: No  GYNECOLOGIC SYMPTOMS: No  BREAST SYMPTOMS: No  SKELETAL SYMPTOMS: Yes  BLOOD SYMPTOMS: No  NERVOUS SYSTEM SYMPTOMS: Yes  MENTAL HEALTH SYMPTOMS: Yes  Chest pain or pressure: Yes  Fast or irregular heartbeat: Yes  Pain in legs with walking: Yes  Varicose veins: Yes  Light-headedness: Yes  Difficulty breating or shortness of breath: Yes  Back pain: Yes  Muscle aches: Yes  Neck pain: Yes  Joint pain: Yes  Dizziness or trouble with balance: Yes  Fainting or black-out spells: Yes  Headache: Yes  Tingling: Yes  Numbness: Yes  Nervous or Anxious: Yes  Trouble sleeping: Yes  Trouble thinking or concentrating: Yes      Exam:  /73 (BP Location: Right arm, Patient Position: Chair, Cuff Size: Adult Regular)   Pulse 79   Ht 1.65 m (5' 4.96\")   Wt 95.3 kg (210 lb 1.6 oz)   LMP 07/22/2022 (Exact Date)   SpO2 98%   BMI 35.00 kg/m    GENERAL APPEARANCE: alert and no distress  HEENT: no icterus, no central cyanosis  LYMPH/NECK: no adenopathy, no asymmetry, JVP not elevated  RESPIRATORY: lungs clear to auscultation - no " rales, rhonchi or wheezes, no use of accessory muscles, no retractions, respirations are unlabored, normal respiratory rate  CARDIOVASCULAR: regular rhythm, normal S1, S2, no S3 or S4 and no murmur, click or rub, precordium quiet with normal PMI.  GI: soft, non tender  EXTREMITIES: no edema  NEURO: alert, normal speech,and affect  SKIN: no ecchymoses, no rashes     I have reviewed the labs and personally reviewed the imaging below and made my comment in the assessment and plan.    Labs:  CBC RESULTS:   Lab Results   Component Value Date    WBC 5.1 08/09/2022    WBC 11.1 (H) 05/09/2018    RBC 5.12 08/09/2022    RBC 4.03 05/09/2018    HGB 14.5 08/09/2022    HGB 8.4 (L) 05/23/2018    HCT 43.6 08/09/2022    HCT 33.2 (L) 05/09/2018    MCV 85 08/09/2022    MCV 82 05/09/2018    MCH 28.3 08/09/2022    MCH 26.1 (L) 05/09/2018    MCHC 33.3 08/09/2022    MCHC 31.6 05/09/2018    RDW 11.9 08/09/2022    RDW 15.1 (H) 05/09/2018     08/09/2022     05/09/2018       BMP RESULTS:  Lab Results   Component Value Date     08/09/2022     11/21/2017    POTASSIUM 3.7 08/09/2022    POTASSIUM 4.2 11/21/2017    CHLORIDE 107 08/09/2022    CHLORIDE 105 11/21/2017    CO2 26 08/09/2022    CO2 25 11/21/2017    ANIONGAP 5 08/09/2022    ANIONGAP 7 11/21/2017     (H) 08/09/2022    GLC 90 11/21/2017    BUN 8 08/09/2022    BUN 6 (L) 11/21/2017    CR 0.83 08/09/2022    CR 0.69 05/09/2018    GFRESTIMATED >90 08/09/2022    GFRESTIMATED >90 05/09/2018    GFRESTBLACK >90 05/09/2018    ERIC 9.1 08/09/2022    ERIC 8.9 11/21/2017      Following test results personally reviewed from care everywhere.  Echocardiogram 5/19/2022  Unremarkable echo.   Left ventricular ejection fraction is visually estimated at 60%.   No significant valvular abnormalities were identified.   Sinus arrhythmia [normal].   No prior studies.     Zio patch 5/4/2022  1.  Zio is unremarkable   2.  Symptoms mostly occurred in association with sinus rhythm without    ectopy; rare/occasional PACs or PVCs were present with some.     Signature:   Joao Valle MD       Indication: (R00.2), Palpitations     Enrollment Period:   04/20/22, 05:03pm to 04/30/22, 02:03pm: 9 days 21 hours     Analysis Time (after artifact removed):   9 days 17 hours     Total Triggers:   23     Total Diaries:   18     Findings within +/- 45 sec of triggered events or diary entries:   Sinus  bpm, SVE(s), VE(s)     Longest Ventricular Bigeminy Episode: 0 s   Longest Ventricular Trigeminy Episode: 0 s     Findings:   Patient had a min HR of 41 bpm, max sinus HR of 156 bpm, and avg HR of 82   bpm. Predominant underlying rhythm was Sinus  Rhythm.     Slight P wave morphology changes were noted.     1 run of Supraventricular Tachycardia occurred lasting 15.7 secs with a max    rate of 122 bpm (avg 115 bpm).     Isolated SVEs were rare (<1.0%), SVE Couplets were rare (<1.0%), and SVE   Triplets were rare (<1.0%).     Isolated VEs were rare (<1.0%), VE Couplets were rare (<1.0%), and no VE   Triplets were present.       EKG August 5, 2022 shows sinus arrhythmia otherwise normal.          Assessment and Plan:     #Vasovagal syncope  #POTS like symptoms  -Patient reports 2 episodes of syncope and daily palpitations associated with shortness of breath and chest discomfort.  Patient was completely asymptomatic prior to March of this year.  So all the symptoms and syncope are very new to her. Since June of this year she is having daily symptoms.  Cardiac work-up including EKG, echocardiogram and Zio patch so far has been unremarkable.  Lab tests all are unremarkable.  We have performed sitting and standing test in clinic today which showed no significant change in blood pressure or heart rate.  At the end of 5 minutes she reported feeling dizzy but blood pressure was 115/81 mmHg and heart rate was 94 bpm. I think her symptoms are consistent with POTS and vasovagal syncope however we do not have an objective  evidence of POTS from sitting and standing test in clinic today.      Recommendations  -Aim for 5 to 10 g of sodium chloride per day  -Start metoprolol 12.5 mg twice daily, recommend to increase to 25 mg twice daily in few weeks if symptoms improve but do not resolve  -Prescribed sodium chloride 1 g 3 times daily  -Aim 60 to 80 ounces per day (patient is currently drinking 120 ounces per day)   -Avoid specific triggers for vasovagal syncope like standing for long time, hot tub, hot shower  -Recommend strength training exercise  -Patient will send [employer] message in a month to update me about her symptoms.    Return to clinic 3 months.    Total time spent today for this visit is 60 minutes including precharting, face-to-face clinic visit, review of labs/imaging and medical documentation.    Please donot hesitate to contact me if you have any questions or concerns. Again, thank you for allowing me to participate in the care of your patient.    Fernanda TORRES MD  [facility] Division of Cardiology  Pager [phone]

## 2022-08-22 NOTE — NURSING NOTE
Chief Complaint   Patient presents with     New Patient     New postural orthostatic synco           Vitals were taken and medications reconciled.     Teo Ramírez, EMT   11:21 AM

## 2022-09-11 ENCOUNTER — HEALTH MAINTENANCE LETTER (OUTPATIENT)
Age: 32
End: 2022-09-11

## 2022-12-04 ENCOUNTER — TELEPHONE (OUTPATIENT)
Dept: CARDIOLOGY | Facility: CLINIC | Age: 32
End: 2022-12-04

## 2023-07-29 ENCOUNTER — HEALTH MAINTENANCE LETTER (OUTPATIENT)
Age: 33
End: 2023-07-29

## 2024-09-21 ENCOUNTER — HEALTH MAINTENANCE LETTER (OUTPATIENT)
Age: 34
End: 2024-09-21

## 2025-02-10 ENCOUNTER — HOSPITAL ENCOUNTER (EMERGENCY)
Facility: CLINIC | Age: 35
Discharge: HOME | End: 2025-02-10
Attending: EMERGENCY MEDICINE | Admitting: EMERGENCY MEDICINE
Payer: COMMERCIAL

## 2025-02-10 ENCOUNTER — APPOINTMENT (OUTPATIENT)
Dept: CT IMAGING | Facility: CLINIC | Age: 35
End: 2025-02-10
Attending: EMERGENCY MEDICINE
Payer: COMMERCIAL

## 2025-02-10 VITALS
TEMPERATURE: 98.4 F | HEIGHT: 66 IN | OXYGEN SATURATION: 98 % | WEIGHT: 250 LBS | HEART RATE: 116 BPM | DIASTOLIC BLOOD PRESSURE: 94 MMHG | BODY MASS INDEX: 40.18 KG/M2 | SYSTOLIC BLOOD PRESSURE: 128 MMHG | RESPIRATION RATE: 24 BRPM

## 2025-02-10 DIAGNOSIS — J10.1 INFLUENZA A: ICD-10-CM

## 2025-02-10 DIAGNOSIS — R06.02 SHORTNESS OF BREATH: ICD-10-CM

## 2025-02-10 DIAGNOSIS — E86.0 DEHYDRATION: ICD-10-CM

## 2025-02-10 LAB
ALBUMIN SERPL BCG-MCNC: 4.5 G/DL (ref 3.5–5.2)
ALP SERPL-CCNC: 87 U/L (ref 40–150)
ALT SERPL W P-5'-P-CCNC: 32 U/L (ref 0–50)
ANION GAP SERPL CALCULATED.3IONS-SCNC: 11 MMOL/L (ref 7–15)
AST SERPL W P-5'-P-CCNC: 23 U/L (ref 0–45)
ATRIAL RATE - MUSE: 109 BPM
BASOPHILS # BLD AUTO: 0 10E3/UL (ref 0–0.2)
BASOPHILS NFR BLD AUTO: 0 %
BILIRUB SERPL-MCNC: 0.6 MG/DL
BUN SERPL-MCNC: 8.5 MG/DL (ref 6–20)
CALCIUM SERPL-MCNC: 9.4 MG/DL (ref 8.8–10.4)
CHLORIDE SERPL-SCNC: 104 MMOL/L (ref 98–107)
CREAT SERPL-MCNC: 0.82 MG/DL (ref 0.51–0.95)
DIASTOLIC BLOOD PRESSURE - MUSE: NORMAL MMHG
EGFRCR SERPLBLD CKD-EPI 2021: >90 ML/MIN/1.73M2
EOSINOPHIL # BLD AUTO: 0 10E3/UL (ref 0–0.7)
EOSINOPHIL NFR BLD AUTO: 1 %
ERYTHROCYTE [DISTWIDTH] IN BLOOD BY AUTOMATED COUNT: 12.6 % (ref 10–15)
GLUCOSE SERPL-MCNC: 102 MG/DL (ref 70–99)
HCG SERPL QL: NEGATIVE
HCO3 SERPL-SCNC: 24 MMOL/L (ref 22–29)
HCT VFR BLD AUTO: 46.5 % (ref 35–47)
HGB BLD-MCNC: 15.6 G/DL (ref 11.7–15.7)
IMM GRANULOCYTES # BLD: 0 10E3/UL
IMM GRANULOCYTES NFR BLD: 0 %
INTERPRETATION ECG - MUSE: NORMAL
LYMPHOCYTES # BLD AUTO: 1.4 10E3/UL (ref 0.8–5.3)
LYMPHOCYTES NFR BLD AUTO: 36 %
MCH RBC QN AUTO: 28.5 PG (ref 26.5–33)
MCHC RBC AUTO-ENTMCNC: 33.5 G/DL (ref 31.5–36.5)
MCV RBC AUTO: 85 FL (ref 78–100)
MONOCYTES # BLD AUTO: 0.3 10E3/UL (ref 0–1.3)
MONOCYTES NFR BLD AUTO: 9 %
NEUTROPHILS # BLD AUTO: 2.1 10E3/UL (ref 1.6–8.3)
NEUTROPHILS NFR BLD AUTO: 55 %
NRBC # BLD AUTO: 0 10E3/UL
NRBC BLD AUTO-RTO: 0 /100
P AXIS - MUSE: 55 DEGREES
PLATELET # BLD AUTO: 221 10E3/UL (ref 150–450)
POTASSIUM SERPL-SCNC: 4.3 MMOL/L (ref 3.4–5.3)
PR INTERVAL - MUSE: 142 MS
PROT SERPL-MCNC: 7.4 G/DL (ref 6.4–8.3)
QRS DURATION - MUSE: 68 MS
QT - MUSE: 314 MS
QTC - MUSE: 422 MS
R AXIS - MUSE: 21 DEGREES
RBC # BLD AUTO: 5.48 10E6/UL (ref 3.8–5.2)
SODIUM SERPL-SCNC: 139 MMOL/L (ref 135–145)
SYSTOLIC BLOOD PRESSURE - MUSE: NORMAL MMHG
T AXIS - MUSE: 18 DEGREES
TROPONIN T SERPL HS-MCNC: <6 NG/L
VENTRICULAR RATE- MUSE: 109 BPM
WBC # BLD AUTO: 3.9 10E3/UL (ref 4–11)

## 2025-02-10 PROCEDURE — 250N000009 HC RX 250: Performed by: EMERGENCY MEDICINE

## 2025-02-10 PROCEDURE — 93005 ELECTROCARDIOGRAM TRACING: CPT

## 2025-02-10 PROCEDURE — 99285 EMERGENCY DEPT VISIT HI MDM: CPT | Mod: 25

## 2025-02-10 PROCEDURE — 96361 HYDRATE IV INFUSION ADD-ON: CPT

## 2025-02-10 PROCEDURE — 80053 COMPREHEN METABOLIC PANEL: CPT | Performed by: EMERGENCY MEDICINE

## 2025-02-10 PROCEDURE — 84484 ASSAY OF TROPONIN QUANT: CPT | Performed by: EMERGENCY MEDICINE

## 2025-02-10 PROCEDURE — 85004 AUTOMATED DIFF WBC COUNT: CPT | Performed by: EMERGENCY MEDICINE

## 2025-02-10 PROCEDURE — 250N000011 HC RX IP 250 OP 636: Performed by: EMERGENCY MEDICINE

## 2025-02-10 PROCEDURE — 71275 CT ANGIOGRAPHY CHEST: CPT

## 2025-02-10 PROCEDURE — 85049 AUTOMATED PLATELET COUNT: CPT | Performed by: EMERGENCY MEDICINE

## 2025-02-10 PROCEDURE — 258N000003 HC RX IP 258 OP 636: Performed by: EMERGENCY MEDICINE

## 2025-02-10 PROCEDURE — 36415 COLL VENOUS BLD VENIPUNCTURE: CPT | Performed by: EMERGENCY MEDICINE

## 2025-02-10 PROCEDURE — 96360 HYDRATION IV INFUSION INIT: CPT | Mod: 59

## 2025-02-10 PROCEDURE — 84703 CHORIONIC GONADOTROPIN ASSAY: CPT | Performed by: EMERGENCY MEDICINE

## 2025-02-10 RX ORDER — IOPAMIDOL 755 MG/ML
80 INJECTION, SOLUTION INTRAVASCULAR ONCE
Status: COMPLETED | OUTPATIENT
Start: 2025-02-10 | End: 2025-02-10

## 2025-02-10 RX ADMIN — SODIUM CHLORIDE 100 ML: 9 INJECTION, SOLUTION INTRAVENOUS at 09:49

## 2025-02-10 RX ADMIN — SODIUM CHLORIDE 1000 ML: 9 INJECTION, SOLUTION INTRAVENOUS at 09:08

## 2025-02-10 RX ADMIN — IOPAMIDOL 80 ML: 755 INJECTION, SOLUTION INTRAVENOUS at 09:48

## 2025-02-10 ASSESSMENT — COLUMBIA-SUICIDE SEVERITY RATING SCALE - C-SSRS
1. IN THE PAST MONTH, HAVE YOU WISHED YOU WERE DEAD OR WISHED YOU COULD GO TO SLEEP AND NOT WAKE UP?: NO
2. HAVE YOU ACTUALLY HAD ANY THOUGHTS OF KILLING YOURSELF IN THE PAST MONTH?: NO
6. HAVE YOU EVER DONE ANYTHING, STARTED TO DO ANYTHING, OR PREPARED TO DO ANYTHING TO END YOUR LIFE?: NO

## 2025-02-10 ASSESSMENT — ACTIVITIES OF DAILY LIVING (ADL)
ADLS_ACUITY_SCORE: 41

## 2025-02-10 NOTE — ED TRIAGE NOTES
Reports dx with flu A on Friday.  +cough congestion. Woke up this AM w worsening SOB/CP.    States hx of factor V.  No hx of blood clots reported.      Triage Assessment (Adult)       Row Name 02/10/25 0846          Triage Assessment    Airway WDL WDL        Respiratory WDL    Respiratory WDL X;rhythm/pattern;cough     Rhythm/Pattern, Respiratory shortness of breath        Cardiac WDL    Cardiac WDL X     Cardiac Rhythm ST

## 2025-02-10 NOTE — ED PROVIDER NOTES
"  Emergency Department Note      History of Present Illness     Chief Complaint   Flu Symptoms      HPI   Arpita Marcano is a 34 year old female with history of factor vomiting Leiden who presents to the ED via for evaluation of flu symptoms. The patient reports that she was diagnosed with influenza A this past Friday and that she felt that her symptoms have been improving until this morning where she woke up with worsening shortness of breath, chest pain, and upper back pain that is exacerbated with deep breathing. She states that she has been congested with a mild cough, and that she has been pushing fluids with mild diarrhea. She reports that she hasn't had a fever since Friday and that denies history of any blood clots, recent vomiting, or chance of pregnancy.     Independent Historian   None    Review of External Notes   Progress note from 2/7/2025 where diagnosed positive with influenza A.    Past Medical History     Medical History and Problem List   LEON  Factor V Leiden  Fetal macrosomia, delivered, current hospitalization  Migraines  Obesity during pregnancy, delivered  Postpartum hemorrhage, delivered, current hospitalization  Vacuum extraction, delivered, current hospitalization    Medications   DULoxetine (CYMBALTA) 30 MG capsule  metoprolol tartrate (LOPRESSOR) 25 MG tablet  Rizatriptan 10 mg  Topiramate  25 mg  Fluoxetine 10 mg    Surgical History   Bunionectomy  Dilation and curettage suction  Tonsillectomy    Physical Exam     Patient Vitals for the past 24 hrs:   BP Temp Temp src Pulse Resp SpO2 Height Weight   02/10/25 0845 (!) 128/94 98.4  F (36.9  C) Temporal 116 24 98 % 1.676 m (5' 6\") 113.4 kg (250 lb)     Physical Exam  General: Patient is alert and normal appearing.  HEENT: Head atraumatic    Eyes: pupils equal and reactive. Conjunctiva clear   Nares: patent   Oropharynx: no lesions, uvula midline, no palatal draping, normal voice, no trismus  Neck: Supple without lymphadenopathy, no " meningismus  Chest: tachycardic but regular  Lungs: Equal clear to auscultation with no wheeze or rales  Abdomen: Soft, non tender, nondistended, normal bowel sounds  Back: No costovertebral angle tenderness, no midline C, T or L spine tenderness  Neuro: Grossly nonfocal, normal speech, strength equal bilaterally, CN 2-12 intact  Extremities: No deformities, equal radial and DP pulses. No clubbing, cyanosis.  No edema  Skin: Warm and dry with no rash.       Diagnostics     Lab Results   Labs Ordered and Resulted from Time of ED Arrival to Time of ED Departure   COMPREHENSIVE METABOLIC PANEL - Abnormal       Result Value    Sodium 139      Potassium 4.3      Carbon Dioxide (CO2) 24      Anion Gap 11      Urea Nitrogen 8.5      Creatinine 0.82      GFR Estimate >90      Calcium 9.4      Chloride 104      Glucose 102 (*)     Alkaline Phosphatase 87      AST 23      ALT 32      Protein Total 7.4      Albumin 4.5      Bilirubin Total 0.6     CBC WITH PLATELETS AND DIFFERENTIAL - Abnormal    WBC Count 3.9 (*)     RBC Count 5.48 (*)     Hemoglobin 15.6      Hematocrit 46.5      MCV 85      MCH 28.5      MCHC 33.5      RDW 12.6      Platelet Count 221      % Neutrophils 55      % Lymphocytes 36      % Monocytes 9      % Eosinophils 1      % Basophils 0      % Immature Granulocytes 0      NRBCs per 100 WBC 0      Absolute Neutrophils 2.1      Absolute Lymphocytes 1.4      Absolute Monocytes 0.3      Absolute Eosinophils 0.0      Absolute Basophils 0.0      Absolute Immature Granulocytes 0.0      Absolute NRBCs 0.0     TROPONIN T, HIGH SENSITIVITY - Normal    Troponin T, High Sensitivity <6     HCG QUALITATIVE PREGNANCY - Normal    hCG Serum Qualitative Negative         Imaging   CT Chest Pulmonary Embolism w Contrast   Final Result   IMPRESSION:   1.  No pulmonary embolus.          EKG   ECG taken at 0838, ECG read at 0900  Sinus tachycardia   Tachycardic as compared to prior, dated 08/09/2022.  Rate 109 bpm. CO interval 142  ms. QRS duration 68 ms. QT/QTc 314/422 ms. P-R-T axes 55 21 18.    Independent Interpretation   None    ED Course      Medications Administered   Medications   sodium chloride 0.9% BOLUS 1,000 mL (1,000 mLs Intravenous $New Bag 2/10/25 0908)   iopamidol (ISOVUE-370) solution 80 mL (80 mLs Intravenous $Given 2/10/25 0948)   SalineFlush (100 mLs Intravenous $Given 2/10/25 0949)       Procedures   Procedures     Discussion of Management   None    ED Course   ED Course as of 02/10/25 1105   Mon Feb 10, 2025   0857 I obtained history and examined the patient as noted above.    1105 I rechecked and updated the patient.        Additional Documentation  None    Medical Decision Making / Diagnosis     CMS Diagnoses: None    MIPS   MIPS:   CT for PE was ordered because the patient is high risk for pulmonary embolism.    MARY Marcano is a 34 year old female with history of factor V Leiden no history of DVT or PE who presents to the emergency department with chest pain and shortness of breath with pleuritic type chest pain as well as tachycardia in the setting of being positive for influenza A last Friday.  No vomiting or diarrhea.  Heart rate improved with fluid hydration here and on my recheck her heart rate was 92.  Patient was high risk for PE given this tachycardia, pleuritic chest pain and history of factor V Leiden.  This was thankfully negative on CT for PE.  No evidence of pneumonia.  Labs within acceptable limits.  EKG without acute ischemic changes and troponin is undetectable making acute coronary syndrome unlikely.  Patient felt better after fluid hydration.  Suspect likely effects of just influenza A.  She is oxygenating well on room air with no increased work of breathing.  Feel she safe for discharge at this time with continued symptomatic care at home with fluid hydration, Tylenol and ibuprofen.  Return precautions emergency department were reviewed.  Patient expressed agreement understanding the plan  and all questions and concerns addressed.    Disposition   The patient was discharged.     Diagnosis     ICD-10-CM    1. Influenza A  J10.1       2. Shortness of breath  R06.02       3. Dehydration  E86.0            Discharge Medications   New Prescriptions    No medications on file     Scribe Disclosure:  I, Chris Obando, am serving as a scribe at 9:02 AM on 2/10/2025 to document services personally performed by Shannon Francois MD based on my observations and the provider's statements to me.        Shannon Francois MD  02/10/25 1116

## 2025-04-26 ENCOUNTER — HEALTH MAINTENANCE LETTER (OUTPATIENT)
Age: 35
End: 2025-04-26

## (undated) DEVICE — GLOVE PROTEXIS W/NEU-THERA 7.5  2D73TE75

## (undated) DEVICE — SUCTION CANNULA UTERINE 08MM CVD  20317

## (undated) DEVICE — LINEN TOWEL PACK X5 5464

## (undated) DEVICE — SPECIMEN TRAP VACUUM SUCTION 003984-901

## (undated) DEVICE — FILTER BERKLEY SAFETOUCH

## (undated) DEVICE — PACK MINOR LITHOTOMY RIDGES

## (undated) DEVICE — SOL NACL 0.9% IRRIG 1000ML BOTTLE 2F7124

## (undated) DEVICE — TUBING VACUUM COLLECTION 6FT 23116

## (undated) DEVICE — LINEN HALF SHEET 5512

## (undated) DEVICE — LINEN FULL SHEET 5511

## (undated) DEVICE — CATH INTERMITTENT CLEAN-CATH FEMALE 14FR 6" VINYL LF 420614

## (undated) DEVICE — BAG CLEAR TRASH 1.3M 39X33" P4040C

## (undated) DEVICE — PAD CHUX UNDERPAD 30X36" P3036C

## (undated) RX ORDER — LIDOCAINE HYDROCHLORIDE 10 MG/ML
INJECTION, SOLUTION EPIDURAL; INFILTRATION; INTRACAUDAL; PERINEURAL
Status: DISPENSED
Start: 2017-05-30

## (undated) RX ORDER — DEXAMETHASONE SODIUM PHOSPHATE 4 MG/ML
INJECTION, SOLUTION INTRA-ARTICULAR; INTRALESIONAL; INTRAMUSCULAR; INTRAVENOUS; SOFT TISSUE
Status: DISPENSED
Start: 2017-05-30

## (undated) RX ORDER — PROPOFOL 10 MG/ML
INJECTION, EMULSION INTRAVENOUS
Status: DISPENSED
Start: 2017-05-30

## (undated) RX ORDER — FENTANYL CITRATE 50 UG/ML
INJECTION, SOLUTION INTRAMUSCULAR; INTRAVENOUS
Status: DISPENSED
Start: 2017-05-30

## (undated) RX ORDER — ONDANSETRON 2 MG/ML
INJECTION INTRAMUSCULAR; INTRAVENOUS
Status: DISPENSED
Start: 2017-05-30

## (undated) RX ORDER — KETOROLAC TROMETHAMINE 30 MG/ML
INJECTION, SOLUTION INTRAMUSCULAR; INTRAVENOUS
Status: DISPENSED
Start: 2017-05-31

## (undated) RX ORDER — ACETAMINOPHEN 325 MG/1
TABLET ORAL
Status: DISPENSED
Start: 2017-05-30